# Patient Record
Sex: FEMALE | Race: WHITE | NOT HISPANIC OR LATINO | URBAN - METROPOLITAN AREA
[De-identification: names, ages, dates, MRNs, and addresses within clinical notes are randomized per-mention and may not be internally consistent; named-entity substitution may affect disease eponyms.]

---

## 2018-05-05 ENCOUNTER — EMERGENCY (EMERGENCY)
Facility: HOSPITAL | Age: 68
LOS: 0 days | Discharge: HOME | End: 2018-05-05
Attending: EMERGENCY MEDICINE | Admitting: EMERGENCY MEDICINE

## 2018-05-05 VITALS
WEIGHT: 190.04 LBS | OXYGEN SATURATION: 100 % | RESPIRATION RATE: 18 BRPM | TEMPERATURE: 98 F | SYSTOLIC BLOOD PRESSURE: 164 MMHG | HEART RATE: 70 BPM | DIASTOLIC BLOOD PRESSURE: 77 MMHG | HEIGHT: 67 IN

## 2018-05-05 VITALS
SYSTOLIC BLOOD PRESSURE: 144 MMHG | OXYGEN SATURATION: 100 % | RESPIRATION RATE: 18 BRPM | DIASTOLIC BLOOD PRESSURE: 82 MMHG | TEMPERATURE: 98 F | HEART RATE: 70 BPM

## 2018-05-05 DIAGNOSIS — Z79.82 LONG TERM (CURRENT) USE OF ASPIRIN: ICD-10-CM

## 2018-05-05 DIAGNOSIS — R10.9 UNSPECIFIED ABDOMINAL PAIN: ICD-10-CM

## 2018-05-05 DIAGNOSIS — E03.9 HYPOTHYROIDISM, UNSPECIFIED: ICD-10-CM

## 2018-05-05 DIAGNOSIS — I10 ESSENTIAL (PRIMARY) HYPERTENSION: ICD-10-CM

## 2018-05-05 DIAGNOSIS — E78.5 HYPERLIPIDEMIA, UNSPECIFIED: ICD-10-CM

## 2018-05-05 DIAGNOSIS — Z79.899 OTHER LONG TERM (CURRENT) DRUG THERAPY: ICD-10-CM

## 2018-05-05 DIAGNOSIS — R19.7 DIARRHEA, UNSPECIFIED: ICD-10-CM

## 2018-05-05 DIAGNOSIS — R10.12 LEFT UPPER QUADRANT PAIN: ICD-10-CM

## 2018-05-05 LAB
ALBUMIN SERPL ELPH-MCNC: 4.2 G/DL — SIGNIFICANT CHANGE UP (ref 3.5–5.2)
ALP SERPL-CCNC: 63 U/L — SIGNIFICANT CHANGE UP (ref 30–115)
ALT FLD-CCNC: 12 U/L — SIGNIFICANT CHANGE UP (ref 0–41)
ANION GAP SERPL CALC-SCNC: 12 MMOL/L — SIGNIFICANT CHANGE UP (ref 7–14)
APPEARANCE UR: CLEAR — SIGNIFICANT CHANGE UP
AST SERPL-CCNC: 15 U/L — SIGNIFICANT CHANGE UP (ref 0–41)
BACTERIA # UR AUTO: (no result)
BASOPHILS # BLD AUTO: 0.04 K/UL — SIGNIFICANT CHANGE UP (ref 0–0.2)
BASOPHILS NFR BLD AUTO: 0.6 % — SIGNIFICANT CHANGE UP (ref 0–1)
BILIRUB SERPL-MCNC: 0.4 MG/DL — SIGNIFICANT CHANGE UP (ref 0.2–1.2)
BILIRUB UR-MCNC: NEGATIVE — SIGNIFICANT CHANGE UP
BUN SERPL-MCNC: 17 MG/DL — SIGNIFICANT CHANGE UP (ref 10–20)
CALCIUM SERPL-MCNC: 9.5 MG/DL — SIGNIFICANT CHANGE UP (ref 8.5–10.1)
CHLORIDE SERPL-SCNC: 104 MMOL/L — SIGNIFICANT CHANGE UP (ref 98–110)
CK MB CFR SERPL CALC: 1.8 NG/ML — SIGNIFICANT CHANGE UP (ref 0.6–6.3)
CK SERPL-CCNC: 166 U/L — SIGNIFICANT CHANGE UP (ref 0–225)
CO2 SERPL-SCNC: 24 MMOL/L — SIGNIFICANT CHANGE UP (ref 17–32)
COLOR SPEC: YELLOW — SIGNIFICANT CHANGE UP
CREAT SERPL-MCNC: 0.9 MG/DL — SIGNIFICANT CHANGE UP (ref 0.7–1.5)
DIFF PNL FLD: (no result)
EOSINOPHIL # BLD AUTO: 0.29 K/UL — SIGNIFICANT CHANGE UP (ref 0–0.7)
EOSINOPHIL NFR BLD AUTO: 4.2 % — SIGNIFICANT CHANGE UP (ref 0–8)
EPI CELLS # UR: (no result) /HPF
GLUCOSE SERPL-MCNC: 109 MG/DL — HIGH (ref 70–99)
GLUCOSE UR QL: NEGATIVE MG/DL — SIGNIFICANT CHANGE UP
HCT VFR BLD CALC: 27.6 % — LOW (ref 37–47)
HGB BLD-MCNC: 8.1 G/DL — LOW (ref 12–16)
IMM GRANULOCYTES NFR BLD AUTO: 0.3 % — SIGNIFICANT CHANGE UP (ref 0.1–0.3)
KETONES UR-MCNC: NEGATIVE — SIGNIFICANT CHANGE UP
LACTATE SERPL-SCNC: 2.3 MMOL/L — HIGH (ref 0.5–2.2)
LEUKOCYTE ESTERASE UR-ACNC: (no result)
LIDOCAIN IGE QN: 28 U/L — SIGNIFICANT CHANGE UP (ref 7–60)
LYMPHOCYTES # BLD AUTO: 1.81 K/UL — SIGNIFICANT CHANGE UP (ref 1.2–3.4)
LYMPHOCYTES # BLD AUTO: 26.5 % — SIGNIFICANT CHANGE UP (ref 20.5–51.1)
MCHC RBC-ENTMCNC: 19.5 PG — LOW (ref 27–31)
MCHC RBC-ENTMCNC: 29.3 G/DL — LOW (ref 32–37)
MCV RBC AUTO: 66.5 FL — LOW (ref 81–99)
MONOCYTES # BLD AUTO: 0.77 K/UL — HIGH (ref 0.1–0.6)
MONOCYTES NFR BLD AUTO: 11.3 % — HIGH (ref 1.7–9.3)
NEUTROPHILS # BLD AUTO: 3.91 K/UL — SIGNIFICANT CHANGE UP (ref 1.4–6.5)
NEUTROPHILS NFR BLD AUTO: 57.1 % — SIGNIFICANT CHANGE UP (ref 42.2–75.2)
NITRITE UR-MCNC: NEGATIVE — SIGNIFICANT CHANGE UP
NRBC # BLD: 0 /100 WBCS — SIGNIFICANT CHANGE UP (ref 0–0)
PH UR: 6 — SIGNIFICANT CHANGE UP (ref 5–8)
PLATELET # BLD AUTO: 343 K/UL — SIGNIFICANT CHANGE UP (ref 130–400)
POTASSIUM SERPL-MCNC: 4.3 MMOL/L — SIGNIFICANT CHANGE UP (ref 3.5–5)
POTASSIUM SERPL-SCNC: 4.3 MMOL/L — SIGNIFICANT CHANGE UP (ref 3.5–5)
PROT SERPL-MCNC: 7.4 G/DL — SIGNIFICANT CHANGE UP (ref 6–8)
PROT UR-MCNC: NEGATIVE MG/DL — SIGNIFICANT CHANGE UP
RBC # BLD: 4.15 M/UL — LOW (ref 4.2–5.4)
RBC # FLD: 19.9 % — HIGH (ref 11.5–14.5)
RBC CASTS # UR COMP ASSIST: (no result) /HPF
SODIUM SERPL-SCNC: 140 MMOL/L — SIGNIFICANT CHANGE UP (ref 135–146)
SP GR SPEC: 1.02 — SIGNIFICANT CHANGE UP (ref 1.01–1.03)
TROPONIN T SERPL-MCNC: <0.01 NG/ML — SIGNIFICANT CHANGE UP
UROBILINOGEN FLD QL: 0.2 MG/DL — SIGNIFICANT CHANGE UP (ref 0.2–0.2)
WBC # BLD: 6.84 K/UL — SIGNIFICANT CHANGE UP (ref 4.8–10.8)
WBC # FLD AUTO: 6.84 K/UL — SIGNIFICANT CHANGE UP (ref 4.8–10.8)
WBC UR QL: SIGNIFICANT CHANGE UP /HPF

## 2018-05-05 RX ORDER — ACETAMINOPHEN 500 MG
975 TABLET ORAL ONCE
Qty: 0 | Refills: 0 | Status: COMPLETED | OUTPATIENT
Start: 2018-05-05 | End: 2018-05-05

## 2018-05-05 RX ADMIN — Medication 30 MILLILITER(S): at 03:54

## 2018-05-05 RX ADMIN — Medication 975 MILLIGRAM(S): at 06:49

## 2018-05-05 NOTE — ED PROVIDER NOTE - MEDICAL DECISION MAKING DETAILS
67f w L sided abd pain and mild tender w loose stools. Labs & CT reviewed. Pt reports that she has known anemia and forgot to mention it earlier. No symptoms of anemia at this time. pt comfortable, no further abd tender, tolerating PO intake. results d/w pt and advised regarding importance of outpatient f/u w PMD & GI. pt given copy of results. pt advised regarding symptomatic care and symptoms to prompt ED return

## 2018-05-05 NOTE — ED PROVIDER NOTE - ATTENDING CONTRIBUTION TO CARE
67f w a hx of HLD presents w L sided abdominal pain starting 3 days ago. Pain is cramping/sharp, moderate, intermittent, no radiating, worse after eating. Pt also reporting loose stools. Pt recently traveled to [place].    Review of Systems  Constitutional:  No fever or chills.   Eyes:  Negative.   ENMT:  No nasal congestion, discharge, or throat pain.   Cardiac:  No chest pain, syncope, or edema.  Respiratory:  No dyspnea, wheezing, or cough. No hemoptysis.  GI:  No vomiting, melena, or hematochezia.  :  No dysuria or hematuria. No vaginal bleeding/discharge.  Musculoskeletal:  No joint swelling, joint pain, or back pain.  Skin:  No skin rash, pruritis, jaundice, or lesions.  Neuro:  No headache, loss of sensation, or focal weakness.  No change in mental status.     Physical Exam  General: Awake, alert, NAD, WDWN, non-toxic appearing, NCAT  Eyes: PERRL, EOMI, no icterus, lids and conjunctivae are normal  ENT: External inspection normal, pink/moist membranes, pharynx normal  CV: S1S2, regular rate and rhythm, no murmur/gallops/rubs, no JVD, 2+ pulses b/l, no edema/cords/homans, warm/well-perfused  Respiratory: Normal respiratory rate/effort, no respiratory distress, normal voice, speaking full sentences, lungs clear to auscultation b/l, no wheezing/rales/rhonchi, no retractions, no stridor  Abdomen: Soft abdomen, mild LUQ tender, no distended/guarding/rebound, no CVA tender  Musculoskeletal: FROM all 4 extremities, N/V intact  Neck: FROM neck, supple, no meningismus, trachea midline, no JVD  Integumentary: Color normal for race, warm and dry, no rash  Neuro: Oriented x3, CN 2-12 grossly intact, normal motor, normal sensory  Psych: Oriented x3, mood normal, affect normal     67f w L sided abd pain and mild tender w loose stools. --CBC, CMP, lipase, UA, CT abd r/o diverticulitis. --Analgesai as needed, observe/re-assess.

## 2018-05-05 NOTE — ED PROVIDER NOTE - NS ED ROS FT
Eyes:  No visual changes  ENMT:  No hearing changes  Cardiac:  No chest pain, SOB   Respiratory:  No cough or respiratory distress.   :  No dysuria, frequency or burning.  MS:  No back pain.  Neuro:  No headache   Skin:  No skin rash.   Endocrine: No history of diabetes.

## 2018-05-05 NOTE — ED PROVIDER NOTE - OBJECTIVE STATEMENT
67 y F pmh including hypothyroidism, hld, c sections, cc left sided abdominal pain associated with sometimes loose stools x 3 days, intermittent crampy, no fever no vomiting, no dysuria or hematuria, no vag bleeding. 67 y F pmh including hypothyroidism, hld, cc left sided abdominal pain associated with sometimes loose stools x 3 days, intermittent crampy, no fever no vomiting, no dysuria or hematuria, no vag bleeding.

## 2018-05-05 NOTE — ED PROVIDER NOTE - PHYSICAL EXAMINATION
CONSTITUTIONAL: Well-developed; well-nourished; in no acute distress.   SKIN: warm, dry  HEAD: Normocephalic; atraumatic.  EYES: no conj injection  ENT: No nasal discharge; airway clear.  NECK: Supple; non tender.  CARD: S1, S2 normal; no murmurs, gallops, or rubs. Regular rate and rhythm.   RESP: No wheezes, rales or rhonchi.  ABD: soft, non distended, points to left flank as site of pain, no cva tenderness  EXT: Normal ROM.  No clubbing, cyanosis or edema.   LYMPH: No acute cervical adenopathy.  NEURO: Alert, oriented, grossly unremarkable  PSYCH: Cooperative, appropriate.

## 2018-06-04 ENCOUNTER — RESULT REVIEW (OUTPATIENT)
Age: 68
End: 2018-06-04

## 2018-06-04 ENCOUNTER — OUTPATIENT (OUTPATIENT)
Dept: OUTPATIENT SERVICES | Facility: HOSPITAL | Age: 68
LOS: 1 days | Discharge: HOME | End: 2018-06-04

## 2018-06-04 VITALS — SYSTOLIC BLOOD PRESSURE: 136 MMHG | RESPIRATION RATE: 18 BRPM | DIASTOLIC BLOOD PRESSURE: 65 MMHG | HEART RATE: 65 BPM

## 2018-06-04 VITALS
WEIGHT: 184.97 LBS | HEART RATE: 86 BPM | RESPIRATION RATE: 18 BRPM | DIASTOLIC BLOOD PRESSURE: 86 MMHG | HEIGHT: 67 IN | SYSTOLIC BLOOD PRESSURE: 181 MMHG | TEMPERATURE: 98 F

## 2018-06-04 RX ORDER — METOPROLOL TARTRATE 50 MG
0 TABLET ORAL
Qty: 0 | Refills: 0 | COMMUNITY

## 2018-06-04 NOTE — H&P ADULT - NSHPPHYSICALEXAM_GEN_ALL_CORE
PHYSICAL EXAM:   Vital Signs:  Vital Signs Last 24 Hrs  T(C): 36.7 (04 Jun 2018 06:55), Max: 36.7 (04 Jun 2018 06:55)  T(F): 98 (04 Jun 2018 06:55), Max: 98 (04 Jun 2018 06:55)  HR: 86 (04 Jun 2018 06:55) (86 - 86)  BP: 181/86 (04 Jun 2018 06:55) (181/86 - 181/86)  BP(mean): --  RR: 18 (04 Jun 2018 06:55) (18 - 18)  SpO2: --  Daily Height in cm: 170.18 (04 Jun 2018 06:55)    Daily     GENERAL:  Appears stated age, well-groomed, well-nourished, no distress  HEENT:  NC/AT,  conjunctivae clear and pink, no thyromegaly, nodules, adenopathy, no JVD, sclera -anicteric  CHEST:  Full & symmetric excursion, no increased effort, breath sounds clear  HEART:  Regular rhythm, S1, S2, no murmur/rub/S3/S4, no abdominal bruit, no edema  ABDOMEN:  Soft, non-tender, non-distended, normoactive bowel sounds,  no masses ,no hepato-splenomegaly, no signs of chronic liver disease  EXTEREMITIES:  no cyanosis,clubbing or edema  SKIN:  No rash/erythema/ecchymoses/petechiae/wounds/abscess/warm/dry  NEURO:  Alert, oriented, no asterixis, no tremor, no encephalopathy

## 2018-06-04 NOTE — ASU DISCHARGE PLAN (ADULT/PEDIATRIC). - NOTIFY
Fever greater than 101/Inability to Tolerate Liquids or Foods/Pain not relieved by Medications/Persistent Nausea and Vomiting/Bleeding that does not stop

## 2018-06-04 NOTE — PRE-ANESTHESIA EVALUATION ADULT - HEIGHT IN CM
Patient received to [address] [place] [address] room # 6  Ambulatory from [facility]. Patient scheduled for generator change today with Dr Poncho Clifton. Procedure reviewed & questions answered, voiced good understanding consent obtained & placed on chart. All medications and medical history reviewed. Will prep patient per orders. Patient & family updated on plan of care. 170.18

## 2018-06-06 DIAGNOSIS — D64.9 ANEMIA, UNSPECIFIED: ICD-10-CM

## 2018-06-06 DIAGNOSIS — D12.5 BENIGN NEOPLASM OF SIGMOID COLON: ICD-10-CM

## 2018-06-06 DIAGNOSIS — K64.8 OTHER HEMORRHOIDS: ICD-10-CM

## 2018-06-06 DIAGNOSIS — I10 ESSENTIAL (PRIMARY) HYPERTENSION: ICD-10-CM

## 2018-06-06 DIAGNOSIS — Z12.11 ENCOUNTER FOR SCREENING FOR MALIGNANT NEOPLASM OF COLON: ICD-10-CM

## 2018-06-06 DIAGNOSIS — K57.30 DIVERTICULOSIS OF LARGE INTESTINE WITHOUT PERFORATION OR ABSCESS WITHOUT BLEEDING: ICD-10-CM

## 2018-06-06 DIAGNOSIS — K64.4 RESIDUAL HEMORRHOIDAL SKIN TAGS: ICD-10-CM

## 2018-06-18 ENCOUNTER — OUTPATIENT (OUTPATIENT)
Dept: OUTPATIENT SERVICES | Facility: HOSPITAL | Age: 68
LOS: 1 days | Discharge: HOME | End: 2018-06-18

## 2018-06-18 ENCOUNTER — RESULT REVIEW (OUTPATIENT)
Age: 68
End: 2018-06-18

## 2018-06-18 VITALS
WEIGHT: 179.9 LBS | DIASTOLIC BLOOD PRESSURE: 79 MMHG | HEIGHT: 67 IN | TEMPERATURE: 98 F | SYSTOLIC BLOOD PRESSURE: 139 MMHG | HEART RATE: 64 BPM | RESPIRATION RATE: 18 BRPM

## 2018-06-18 VITALS
RESPIRATION RATE: 18 BRPM | HEART RATE: 58 BPM | SYSTOLIC BLOOD PRESSURE: 126 MMHG | DIASTOLIC BLOOD PRESSURE: 55 MMHG | OXYGEN SATURATION: 99 %

## 2018-06-18 DIAGNOSIS — Z98.890 OTHER SPECIFIED POSTPROCEDURAL STATES: Chronic | ICD-10-CM

## 2018-06-21 DIAGNOSIS — K31.9 DISEASE OF STOMACH AND DUODENUM, UNSPECIFIED: ICD-10-CM

## 2018-06-21 DIAGNOSIS — K29.50 UNSPECIFIED CHRONIC GASTRITIS WITHOUT BLEEDING: ICD-10-CM

## 2018-06-21 DIAGNOSIS — K29.80 DUODENITIS WITHOUT BLEEDING: ICD-10-CM

## 2018-06-21 DIAGNOSIS — D64.9 ANEMIA, UNSPECIFIED: ICD-10-CM

## 2018-06-21 DIAGNOSIS — K44.9 DIAPHRAGMATIC HERNIA WITHOUT OBSTRUCTION OR GANGRENE: ICD-10-CM

## 2019-05-28 PROBLEM — E03.9 HYPOTHYROIDISM, UNSPECIFIED: Chronic | Status: ACTIVE | Noted: 2018-05-05

## 2019-05-28 PROBLEM — R00.2 PALPITATIONS: Chronic | Status: ACTIVE | Noted: 2018-05-05

## 2019-05-28 PROBLEM — E78.00 PURE HYPERCHOLESTEROLEMIA, UNSPECIFIED: Chronic | Status: ACTIVE | Noted: 2018-06-18

## 2019-05-30 ENCOUNTER — OUTPATIENT (OUTPATIENT)
Dept: OUTPATIENT SERVICES | Facility: HOSPITAL | Age: 69
LOS: 1 days | Discharge: HOME | End: 2019-05-30
Payer: MEDICARE

## 2019-05-30 DIAGNOSIS — E03.9 HYPOTHYROIDISM, UNSPECIFIED: ICD-10-CM

## 2019-05-30 DIAGNOSIS — Z98.890 OTHER SPECIFIED POSTPROCEDURAL STATES: Chronic | ICD-10-CM

## 2019-05-30 PROCEDURE — 76536 US EXAM OF HEAD AND NECK: CPT | Mod: 26

## 2022-08-29 ENCOUNTER — APPOINTMENT (OUTPATIENT)
Dept: ORTHOPEDIC SURGERY | Facility: CLINIC | Age: 72
End: 2022-08-29

## 2022-08-29 VITALS — WEIGHT: 160 LBS | HEIGHT: 67 IN | BODY MASS INDEX: 25.11 KG/M2

## 2022-08-29 PROBLEM — Z00.00 ENCOUNTER FOR PREVENTIVE HEALTH EXAMINATION: Status: ACTIVE | Noted: 2022-08-29

## 2022-08-29 PROCEDURE — 99203 OFFICE O/P NEW LOW 30 MIN: CPT

## 2022-08-29 PROCEDURE — 73562 X-RAY EXAM OF KNEE 3: CPT | Mod: RT

## 2022-08-29 NOTE — DATA REVIEWED
[FreeTextEntry1] :   X-rays taken the office today for right knee show tricompartmental primary osteoarthritis without any acute displaced fractures or bony abnormalities.

## 2022-08-29 NOTE — PHYSICAL EXAM
[de-identified] :   Physical exam of her right knee:  Negative swelling.  There is a small superficial abrasion over the anterior aspect of the knee.  No signs of infection.  Nontender over the patellar facet.  Negative patellar grind.  She can actively straight leg raise.  Full range of  motion.  Mild medial and lateral joint tenderness.  Calves are soft and nontender.  Negative James.  Stable to anterior/posterior drawer, valgus/varus stress today.  Strength in her lower extremities 5/5.  Sensory and motor are intact.

## 2022-08-29 NOTE — HISTORY OF PRESENT ILLNESS
[de-identified] :   Patient is a 71-year-old female here for evaluation of her right knee.She states that yesterday, 08/29/2022, she was playing wiffle ball when she fell landed on her knee. She has been having some discomfort since then.  She does feel better since last night.  She took 2 aleve.  He denies any instability.

## 2022-08-29 NOTE — DISCUSSION/SUMMARY
[de-identified] :   She will ice and rest the area.\par She know she has a history of knee arthritis.  It typically does not give her much trouble.\par She will continue taking Aleve twice a day for inflammation.\par She may weightbear as tolerated.\par We discussed the option of a cortisone injection in the future.\par All questions were answered today.  She will contact the office with any questions or concerns.\par I will see her back in a month for repeat evaluation if she has continued pain.

## 2022-09-27 ENCOUNTER — APPOINTMENT (OUTPATIENT)
Dept: ORTHOPEDIC SURGERY | Facility: CLINIC | Age: 72
End: 2022-09-27

## 2022-09-27 VITALS — WEIGHT: 160 LBS | BODY MASS INDEX: 25.11 KG/M2 | HEIGHT: 67 IN

## 2022-09-27 DIAGNOSIS — S80.01XA CONTUSION OF RIGHT KNEE, INITIAL ENCOUNTER: ICD-10-CM

## 2022-09-27 PROCEDURE — 99213 OFFICE O/P EST LOW 20 MIN: CPT | Mod: 25

## 2022-09-27 PROCEDURE — 20610 DRAIN/INJ JOINT/BURSA W/O US: CPT

## 2022-09-27 NOTE — PHYSICAL EXAM
[de-identified] :   Physical exam of her right knee:  Negative swelling or ecchymosis.  Nontender over the patellar facet.  Negative patellar grind.  She can actively straight leg raise.  Mild medial and lateral joint tenderness.  Calves are soft and nontender.  Negative James.  Stable to anterior/posterior drawer, valgus/varus stress testing.  Strength in her lower extremities 5/5.  Sensory and motor are intact.

## 2022-09-27 NOTE — HISTORY OF PRESENT ILLNESS
[de-identified] :   Patient is a 71-year-old female here for repeat evaluation of her right knee.  She is doing much better.  She takes Advil as needed.  The pain is worse at night.  Her symptoms have improved since her previous visit.

## 2022-09-27 NOTE — DISCUSSION/SUMMARY
[de-identified] :   Overall, she is feeling much better.\par She is about a month status post her injury.\par She is feeling better but still has some discomfort at night.\par We discussed the risks and benefits of a cortisone injection.  She would like to proceed with the injection.\par Under sterile technique with the patient's consent, I injected 3 cc dexamethasone and 2 cc of 1% lidocaine into the lateral joint space of the right knee.  She tolerated the procedure well.  The puncture sites cover the Band-Aid.  She was advised to ice and rest the area.\par She understands she cannot get another cortisone injection for another 3-4 months.\par She will follow up in the office on as-needed basis.  She will contact if she has any questions or concerns.

## 2023-05-25 ENCOUNTER — APPOINTMENT (OUTPATIENT)
Dept: ORTHOPEDIC SURGERY | Facility: CLINIC | Age: 73
End: 2023-05-25
Payer: MEDICARE

## 2023-05-25 PROCEDURE — 99213 OFFICE O/P EST LOW 20 MIN: CPT | Mod: 25

## 2023-05-25 PROCEDURE — 20610 DRAIN/INJ JOINT/BURSA W/O US: CPT | Mod: RT

## 2023-05-25 PROCEDURE — 73560 X-RAY EXAM OF KNEE 1 OR 2: CPT | Mod: RT

## 2023-05-25 RX ORDER — MELOXICAM 15 MG/1
15 TABLET ORAL
Qty: 30 | Refills: 1 | Status: ACTIVE | COMMUNITY
Start: 2023-05-25 | End: 1900-01-01

## 2023-05-25 NOTE — IMAGING
[de-identified] : Right knee exam is as follows: Minimal effusion noted.  No erythema or ecchymosis.  Able to perform active straight leg raise.  Knee flexion from 0 to 120 degrees.  Patellofemoral, medial/lateral joint line tenderness to palpation.  Calf is soft and nontender.  Positive James's.  Light touch intact throughout.  Nonantalgic gait.

## 2023-05-25 NOTE — DISCUSSION/SUMMARY
[de-identified] : Mobic 15 mg PO QD PRN was sent to patient's pharmacy to help alleviate their symptoms.  The patient was advised to rest/ice the area and may alternate with warm compresses as needed.  The knee conditioning program from the [facility] was given to the patient so they may try that at home.\par \par With the patient's approval, and under sterile technique, I performed a steroid injection today.  See the attached procedure note for further details.  The patient was informed that their next cortisone injection could not be until a minimum of three months from today's date and the patient understands.  Explained to the patient that the full effect of the injection will take 3-5 days to kick in. \par \par Right knee MRI ordered for further evaluation.  Patient was advised to call the office a few days after getting the MRI done to discuss results over the phone.  The patient will follow-up in 3 months for further evaluation.  All of the patient's questions/concerns were answered in detail.\par \par

## 2023-05-25 NOTE — HISTORY OF PRESENT ILLNESS
[de-identified] : Patient is a 72-year-old female who reports to the office for reevaluation of her right knee pain.  She had a cortisone injection done in September of last year which gave her relief and is interested in having another one done today.  Walking, certain range of motion, palpating certain areas of the knee aggravate the patient's pain at times.  Admits to the knee clicks occasionally.  Denies any numbness or tingling

## 2023-09-14 ENCOUNTER — APPOINTMENT (OUTPATIENT)
Dept: ORTHOPEDIC SURGERY | Facility: CLINIC | Age: 73
End: 2023-09-14
Payer: MEDICARE

## 2023-09-14 PROCEDURE — 99213 OFFICE O/P EST LOW 20 MIN: CPT

## 2023-12-08 ENCOUNTER — APPOINTMENT (OUTPATIENT)
Dept: ORTHOPEDIC SURGERY | Facility: CLINIC | Age: 73
End: 2023-12-08

## 2023-12-15 ENCOUNTER — APPOINTMENT (OUTPATIENT)
Dept: ORTHOPEDIC SURGERY | Facility: CLINIC | Age: 73
End: 2023-12-15
Payer: MEDICARE

## 2023-12-15 PROCEDURE — 99213 OFFICE O/P EST LOW 20 MIN: CPT | Mod: 25

## 2023-12-15 PROCEDURE — 20610 DRAIN/INJ JOINT/BURSA W/O US: CPT | Mod: RT

## 2023-12-15 NOTE — DISCUSSION/SUMMARY
[de-identified] : Right knee Synvisc 1 gel injection  HPI Patient is a 73-year-old female who reports to office for subsequent reevaluation of her right knee pain/osteoarthritis.  She is here to receive her right knee Synvisc 1 gel injection.  Right knee exam is as follows: No effusion noted.  No erythema or ecchymosis.  Knee flexion from 0-110 degrees.  Light touch intact throughout.  Nonantalgic gait.  Assessment/plan With the patient's approval, the right knee Synvisc-1 injection was performed in the office today.  See the attached procedure note for further details.  Explained to the patient that the full effect of the medication may take up to 6 weeks for it to kick in.  The patient was advised to rest/ice the area and can alternate with warm compresses.  Instructed not to do any strenuous activity that would further aggravate their symptoms.  Patient will follow-up in 4-5 months for further evaluation.  All of the patient's questions/concerns were answered in detail.

## 2023-12-15 NOTE — PROCEDURE
[Large Joint Injection] : Large joint injection [Right] : of the right [Knee] : knee [Alcohol] : alcohol [Ethyl Chloride sprayed topically] : ethyl chloride sprayed topically [Sterile technique used] : sterile technique used [Synvisc-one (48mg)] : 48mg of Synvisc-one [] : Patient tolerated procedure well [Call if redness, pain or fever occur] : call if redness, pain or fever occur [Apply ice for 15min out of every hour for the next 12-24 hours as tolerated] : apply ice for 15 minutes out of every hour for the next 12-24 hours as tolerated [Risks, benefits, alternatives discussed / Verbal consent obtained] : the risks benefits, and alternatives have been discussed, and verbal consent was obtained

## 2024-04-08 NOTE — ED ADULT TRIAGE NOTE - DIRECT TO ROOM CARE INITIATED:
05-May-2018 03:24
patient c/o chest pain since last night. patient denies shortness of breath, nausea or vomiting. past medical history of HTN- states this is resolved though

## 2024-04-11 ENCOUNTER — APPOINTMENT (OUTPATIENT)
Dept: ORTHOPEDIC SURGERY | Facility: CLINIC | Age: 74
End: 2024-04-11
Payer: MEDICARE

## 2024-04-11 DIAGNOSIS — M17.11 UNILATERAL PRIMARY OSTEOARTHRITIS, RIGHT KNEE: ICD-10-CM

## 2024-04-11 PROCEDURE — 99213 OFFICE O/P EST LOW 20 MIN: CPT

## 2024-04-11 NOTE — DISCUSSION/SUMMARY
[de-identified] : Right knee pain follow-up  HPI Patient is a 73-year-old female reports to office for subsequent reevaluation of her right knee pain.  She had a Synvisc 1 gel injection done in December 2023 which has given her relief.  Certain range of motion and palpating certain areas in the knee aggravate the patient's pain at times.  Denies any numbness or tingling.  Right knee exam is as follows: No effusion noted.  No erythema or ecchymosis.  Able to perform active straight leg raise.  Knee flexion from 0 to 120 degrees.  Mild medial joint line tenderness to palpation.  Calf is soft and nontender.  Light touch intact throughout.  Nonantalgic gait.  Assessment/plan Patient is doing well, and her pain has been well-controlled.  Synvisc 1 gel injection has given patient relief.  The knee conditioning program from the [facility] was given to the patient so they may try that at home.  OTC Tylenol or Motrin as needed for pain.  Follow-up in 4 months.  All questions/concerns were answered in detail.

## 2024-05-22 ENCOUNTER — INPATIENT (INPATIENT)
Facility: HOSPITAL | Age: 74
LOS: 0 days | Discharge: HOME | DRG: 440 | End: 2024-05-23
Attending: HOSPITALIST | Admitting: HOSPITALIST
Payer: MEDICARE

## 2024-05-22 VITALS
SYSTOLIC BLOOD PRESSURE: 147 MMHG | RESPIRATION RATE: 18 BRPM | HEART RATE: 96 BPM | TEMPERATURE: 98 F | OXYGEN SATURATION: 99 % | HEIGHT: 67 IN | DIASTOLIC BLOOD PRESSURE: 88 MMHG | WEIGHT: 158.95 LBS

## 2024-05-22 DIAGNOSIS — K85.90 ACUTE PANCREATITIS WITHOUT NECROSIS OR INFECTION, UNSPECIFIED: ICD-10-CM

## 2024-05-22 DIAGNOSIS — Z98.890 OTHER SPECIFIED POSTPROCEDURAL STATES: Chronic | ICD-10-CM

## 2024-05-22 LAB
ALBUMIN SERPL ELPH-MCNC: 4 G/DL — SIGNIFICANT CHANGE UP (ref 3.5–5.2)
ALP SERPL-CCNC: 82 U/L — SIGNIFICANT CHANGE UP (ref 30–115)
ALT FLD-CCNC: 13 U/L — SIGNIFICANT CHANGE UP (ref 0–41)
ANION GAP SERPL CALC-SCNC: 15 MMOL/L — HIGH (ref 7–14)
APPEARANCE UR: CLEAR — SIGNIFICANT CHANGE UP
AST SERPL-CCNC: 40 U/L — SIGNIFICANT CHANGE UP (ref 0–41)
BACTERIA # UR AUTO: NEGATIVE /HPF — SIGNIFICANT CHANGE UP
BASOPHILS # BLD AUTO: 0.03 K/UL — SIGNIFICANT CHANGE UP (ref 0–0.2)
BASOPHILS NFR BLD AUTO: 0.3 % — SIGNIFICANT CHANGE UP (ref 0–1)
BILIRUB SERPL-MCNC: 0.6 MG/DL — SIGNIFICANT CHANGE UP (ref 0.2–1.2)
BILIRUB UR-MCNC: NEGATIVE — SIGNIFICANT CHANGE UP
BUN SERPL-MCNC: 11 MG/DL — SIGNIFICANT CHANGE UP (ref 10–20)
CALCIUM SERPL-MCNC: 9.6 MG/DL — SIGNIFICANT CHANGE UP (ref 8.4–10.5)
CAST: 0 /LPF — SIGNIFICANT CHANGE UP (ref 0–4)
CHLORIDE SERPL-SCNC: 100 MMOL/L — SIGNIFICANT CHANGE UP (ref 98–110)
CO2 SERPL-SCNC: 22 MMOL/L — SIGNIFICANT CHANGE UP (ref 17–32)
COLOR SPEC: YELLOW — SIGNIFICANT CHANGE UP
CREAT SERPL-MCNC: 0.7 MG/DL — SIGNIFICANT CHANGE UP (ref 0.7–1.5)
DIFF PNL FLD: ABNORMAL
EGFR: 92 ML/MIN/1.73M2 — SIGNIFICANT CHANGE UP
EOSINOPHIL # BLD AUTO: 0.08 K/UL — SIGNIFICANT CHANGE UP (ref 0–0.7)
EOSINOPHIL NFR BLD AUTO: 0.9 % — SIGNIFICANT CHANGE UP (ref 0–8)
GLUCOSE SERPL-MCNC: 104 MG/DL — HIGH (ref 70–99)
GLUCOSE UR QL: NEGATIVE MG/DL — SIGNIFICANT CHANGE UP
HCT VFR BLD CALC: 39.5 % — SIGNIFICANT CHANGE UP (ref 37–47)
HGB BLD-MCNC: 12.7 G/DL — SIGNIFICANT CHANGE UP (ref 12–16)
IMM GRANULOCYTES NFR BLD AUTO: 0.4 % — HIGH (ref 0.1–0.3)
KETONES UR-MCNC: NEGATIVE MG/DL — SIGNIFICANT CHANGE UP
LEUKOCYTE ESTERASE UR-ACNC: ABNORMAL
LIDOCAIN IGE QN: 25 U/L — SIGNIFICANT CHANGE UP (ref 7–60)
LYMPHOCYTES # BLD AUTO: 1.31 K/UL — SIGNIFICANT CHANGE UP (ref 1.2–3.4)
LYMPHOCYTES # BLD AUTO: 14.7 % — LOW (ref 20.5–51.1)
MCHC RBC-ENTMCNC: 26.9 PG — LOW (ref 27–31)
MCHC RBC-ENTMCNC: 32.2 G/DL — SIGNIFICANT CHANGE UP (ref 32–37)
MCV RBC AUTO: 83.7 FL — SIGNIFICANT CHANGE UP (ref 81–99)
MONOCYTES # BLD AUTO: 1.1 K/UL — HIGH (ref 0.1–0.6)
MONOCYTES NFR BLD AUTO: 12.3 % — HIGH (ref 1.7–9.3)
NEUTROPHILS # BLD AUTO: 6.37 K/UL — SIGNIFICANT CHANGE UP (ref 1.4–6.5)
NEUTROPHILS NFR BLD AUTO: 71.4 % — SIGNIFICANT CHANGE UP (ref 42.2–75.2)
NITRITE UR-MCNC: NEGATIVE — SIGNIFICANT CHANGE UP
NRBC # BLD: 0 /100 WBCS — SIGNIFICANT CHANGE UP (ref 0–0)
PH UR: 6.5 — SIGNIFICANT CHANGE UP (ref 5–8)
PLATELET # BLD AUTO: 280 K/UL — SIGNIFICANT CHANGE UP (ref 130–400)
PMV BLD: 11.8 FL — HIGH (ref 7.4–10.4)
POTASSIUM SERPL-MCNC: 5.1 MMOL/L — HIGH (ref 3.5–5)
POTASSIUM SERPL-SCNC: 5.1 MMOL/L — HIGH (ref 3.5–5)
PROT SERPL-MCNC: 7.8 G/DL — SIGNIFICANT CHANGE UP (ref 6–8)
PROT UR-MCNC: NEGATIVE MG/DL — SIGNIFICANT CHANGE UP
RBC # BLD: 4.72 M/UL — SIGNIFICANT CHANGE UP (ref 4.2–5.4)
RBC # FLD: 15.1 % — HIGH (ref 11.5–14.5)
RBC CASTS # UR COMP ASSIST: 3 /HPF — SIGNIFICANT CHANGE UP (ref 0–4)
SODIUM SERPL-SCNC: 137 MMOL/L — SIGNIFICANT CHANGE UP (ref 135–146)
SP GR SPEC: 1.01 — SIGNIFICANT CHANGE UP (ref 1–1.03)
SQUAMOUS # UR AUTO: 2 /HPF — SIGNIFICANT CHANGE UP (ref 0–5)
UROBILINOGEN FLD QL: 0.2 MG/DL — SIGNIFICANT CHANGE UP (ref 0.2–1)
WBC # BLD: 8.93 K/UL — SIGNIFICANT CHANGE UP (ref 4.8–10.8)
WBC # FLD AUTO: 8.93 K/UL — SIGNIFICANT CHANGE UP (ref 4.8–10.8)
WBC UR QL: 1 /HPF — SIGNIFICANT CHANGE UP (ref 0–5)

## 2024-05-22 PROCEDURE — 80053 COMPREHEN METABOLIC PANEL: CPT

## 2024-05-22 PROCEDURE — 85025 COMPLETE CBC W/AUTO DIFF WBC: CPT

## 2024-05-22 PROCEDURE — 82150 ASSAY OF AMYLASE: CPT

## 2024-05-22 PROCEDURE — 82728 ASSAY OF FERRITIN: CPT

## 2024-05-22 PROCEDURE — 74177 CT ABD & PELVIS W/CONTRAST: CPT | Mod: 26,MC

## 2024-05-22 PROCEDURE — 83735 ASSAY OF MAGNESIUM: CPT

## 2024-05-22 PROCEDURE — 83550 IRON BINDING TEST: CPT

## 2024-05-22 PROCEDURE — 84100 ASSAY OF PHOSPHORUS: CPT

## 2024-05-22 PROCEDURE — 82607 VITAMIN B-12: CPT

## 2024-05-22 PROCEDURE — 83540 ASSAY OF IRON: CPT

## 2024-05-22 PROCEDURE — 99285 EMERGENCY DEPT VISIT HI MDM: CPT | Mod: FS

## 2024-05-22 PROCEDURE — 76770 US EXAM ABDO BACK WALL COMP: CPT

## 2024-05-22 PROCEDURE — 93010 ELECTROCARDIOGRAM REPORT: CPT

## 2024-05-22 PROCEDURE — 82746 ASSAY OF FOLIC ACID SERUM: CPT

## 2024-05-22 PROCEDURE — 36415 COLL VENOUS BLD VENIPUNCTURE: CPT

## 2024-05-22 RX ORDER — FAMOTIDINE 10 MG/ML
20 INJECTION INTRAVENOUS ONCE
Refills: 0 | Status: COMPLETED | OUTPATIENT
Start: 2024-05-22 | End: 2024-05-22

## 2024-05-22 RX ORDER — MORPHINE SULFATE 50 MG/1
2 CAPSULE, EXTENDED RELEASE ORAL ONCE
Refills: 0 | Status: DISCONTINUED | OUTPATIENT
Start: 2024-05-22 | End: 2024-05-22

## 2024-05-22 RX ORDER — SODIUM CHLORIDE 9 MG/ML
2000 INJECTION INTRAMUSCULAR; INTRAVENOUS; SUBCUTANEOUS ONCE
Refills: 0 | Status: COMPLETED | OUTPATIENT
Start: 2024-05-22 | End: 2024-05-22

## 2024-05-22 RX ADMIN — Medication 30 MILLILITER(S): at 15:39

## 2024-05-22 RX ADMIN — SODIUM CHLORIDE 2000 MILLILITER(S): 9 INJECTION INTRAMUSCULAR; INTRAVENOUS; SUBCUTANEOUS at 15:41

## 2024-05-22 RX ADMIN — FAMOTIDINE 20 MILLIGRAM(S): 10 INJECTION INTRAVENOUS at 15:41

## 2024-05-22 RX ADMIN — MORPHINE SULFATE 2 MILLIGRAM(S): 50 CAPSULE, EXTENDED RELEASE ORAL at 20:58

## 2024-05-22 NOTE — ED PROVIDER NOTE - OBJECTIVE STATEMENT
73-year-old female with past medical history HLD, palpitations on metoprolol, hypothyroidism presents with complaints of abdominal pain x 2 weeks.  Reports abdominal pain is localized to bilateral upper quadrants, described as sharp and dull intermittently.  Reports exacerbated with lying flat and eating.  Denies fever/chills, chest pain, shortness of breath, vomiting/diarrhea, dysuria/frequency/urgency/hematuria, lightheadedness, dizziness, vaginal bleeding/discharge.

## 2024-05-22 NOTE — PATIENT PROFILE ADULT - FALL HARM RISK - UNIVERSAL INTERVENTIONS
Bed in lowest position, wheels locked, appropriate side rails in place/Call bell, personal items and telephone in reach/Instruct patient to call for assistance before getting out of bed or chair/Non-slip footwear when patient is out of bed/[place] to call system/Physically safe environment - no spills, clutter or unnecessary equipment/Purposeful Proactive Rounding/Room/bathroom lighting operational, light cord in reach

## 2024-05-22 NOTE — ED PROVIDER NOTE - PHYSICAL EXAMINATION
Vital Signs: I have reviewed the initial vital signs.  Constitutional: appears stated age, no acute distress  Eyes: Sclera clear, EOMI.  Cardiovascular: S1 and S2, regular rate, regular rhythm, well-perfused extremities, radial pulses equal and 2+, pedal pulses 2+ and equal  Respiratory: unlabored respiratory effort, clear to auscultation bilaterally no wheezing, rales, or rhonchi  Gastrointestinal:  abdomen soft, mild epigastric ttp  Musculoskeletal: supple neck, no lower extremity edema  Integumentary: warm, dry, no rash  Neurologic: awake, alert, oriented x3, extremities’ motor and sensory functions grossly intact

## 2024-05-22 NOTE — ED ADULT TRIAGE NOTE - WEIGHT IN LBS
158.9
PAST SURGICAL HISTORY:  History of appendectomy     History of cholecystectomy     History of hip surgery Right Hip Replacement

## 2024-05-22 NOTE — ED PROVIDER NOTE - CLINICAL SUMMARY MEDICAL DECISION MAKING FREE TEXT BOX
Patient with abdominal pain, found to have a pancreatic mass, likely due to acute pancreatitis.  Patient be admitted to medicine and have a GI evaluation.  Any ordered labs and EKG were reviewed.  Any imaging was ordered and reviewed by me.  Appropriate medications for patient's presenting complaints were ordered and effects were reassessed.  Patient's records (prior hospital, ED visit, and/or nursing home notes if available) were reviewed.  Additional history was obtained from EMS, family, and/or PCP (where available).  Escalation to admission/observation was considered.  Patient requires inpatient hospitalization - monitored setting.

## 2024-05-22 NOTE — ED PROVIDER NOTE - ATTENDING APP SHARED VISIT CONTRIBUTION OF CARE
73-year-old female with a past medical history of hyperlipidemia, palpitations, recently diagnosed pancreatic cyst, hypothyroidism presents with 2 to 3 weeks of upper abdominal discomfort and nausea.  No clear trigger.  Denies any chest pain or shortness of breath.  No fever, vomiting, diarrhea or blood in stool.  No GI bleed symptoms.  On exam nontoxic, vital signs noted, abdomen soft, nondistended, mild epigastric and bilateral upper quadrant tenderness, no rebound or guarding.  Normal work of breathing.  Given recent possible mass on outpatient workup will obtain CT abdomen and pelvis.  Labs overall reassuring. disposition pending w/up and reassessment.

## 2024-05-23 ENCOUNTER — TRANSCRIPTION ENCOUNTER (OUTPATIENT)
Age: 74
End: 2024-05-23

## 2024-05-23 VITALS
DIASTOLIC BLOOD PRESSURE: 80 MMHG | SYSTOLIC BLOOD PRESSURE: 155 MMHG | OXYGEN SATURATION: 96 % | RESPIRATION RATE: 18 BRPM | TEMPERATURE: 98 F | HEART RATE: 71 BPM

## 2024-05-23 LAB
ALBUMIN SERPL ELPH-MCNC: 3.6 G/DL — SIGNIFICANT CHANGE UP (ref 3.5–5.2)
ALP SERPL-CCNC: 71 U/L — SIGNIFICANT CHANGE UP (ref 30–115)
ALT FLD-CCNC: 9 U/L — SIGNIFICANT CHANGE UP (ref 0–41)
AMYLASE P1 CFR SERPL: 43 U/L — SIGNIFICANT CHANGE UP (ref 25–115)
ANION GAP SERPL CALC-SCNC: 11 MMOL/L — SIGNIFICANT CHANGE UP (ref 7–14)
AST SERPL-CCNC: 21 U/L — SIGNIFICANT CHANGE UP (ref 0–41)
BASOPHILS # BLD AUTO: 0.02 K/UL — SIGNIFICANT CHANGE UP (ref 0–0.2)
BASOPHILS NFR BLD AUTO: 0.3 % — SIGNIFICANT CHANGE UP (ref 0–1)
BILIRUB SERPL-MCNC: 0.8 MG/DL — SIGNIFICANT CHANGE UP (ref 0.2–1.2)
BUN SERPL-MCNC: 8 MG/DL — LOW (ref 10–20)
CALCIUM SERPL-MCNC: 8.9 MG/DL — SIGNIFICANT CHANGE UP (ref 8.4–10.4)
CHLORIDE SERPL-SCNC: 101 MMOL/L — SIGNIFICANT CHANGE UP (ref 98–110)
CO2 SERPL-SCNC: 23 MMOL/L — SIGNIFICANT CHANGE UP (ref 17–32)
CREAT SERPL-MCNC: 0.7 MG/DL — SIGNIFICANT CHANGE UP (ref 0.7–1.5)
EGFR: 92 ML/MIN/1.73M2 — SIGNIFICANT CHANGE UP
EOSINOPHIL # BLD AUTO: 0.08 K/UL — SIGNIFICANT CHANGE UP (ref 0–0.7)
EOSINOPHIL NFR BLD AUTO: 1.3 % — SIGNIFICANT CHANGE UP (ref 0–8)
GLUCOSE SERPL-MCNC: 107 MG/DL — HIGH (ref 70–99)
HCT VFR BLD CALC: 34.4 % — LOW (ref 37–47)
HGB BLD-MCNC: 11.4 G/DL — LOW (ref 12–16)
IMM GRANULOCYTES NFR BLD AUTO: 0.3 % — SIGNIFICANT CHANGE UP (ref 0.1–0.3)
IRON SATN MFR SERPL: 11 % — LOW (ref 15–50)
IRON SATN MFR SERPL: 27 UG/DL — LOW (ref 35–150)
LYMPHOCYTES # BLD AUTO: 0.92 K/UL — LOW (ref 1.2–3.4)
LYMPHOCYTES # BLD AUTO: 14.6 % — LOW (ref 20.5–51.1)
MAGNESIUM SERPL-MCNC: 2 MG/DL — SIGNIFICANT CHANGE UP (ref 1.8–2.4)
MCHC RBC-ENTMCNC: 27.6 PG — SIGNIFICANT CHANGE UP (ref 27–31)
MCHC RBC-ENTMCNC: 33.1 G/DL — SIGNIFICANT CHANGE UP (ref 32–37)
MCV RBC AUTO: 83.3 FL — SIGNIFICANT CHANGE UP (ref 81–99)
MONOCYTES # BLD AUTO: 0.83 K/UL — HIGH (ref 0.1–0.6)
MONOCYTES NFR BLD AUTO: 13.2 % — HIGH (ref 1.7–9.3)
NEUTROPHILS # BLD AUTO: 4.42 K/UL — SIGNIFICANT CHANGE UP (ref 1.4–6.5)
NEUTROPHILS NFR BLD AUTO: 70.3 % — SIGNIFICANT CHANGE UP (ref 42.2–75.2)
NRBC # BLD: 0 /100 WBCS — SIGNIFICANT CHANGE UP (ref 0–0)
PHOSPHATE SERPL-MCNC: 2.9 MG/DL — SIGNIFICANT CHANGE UP (ref 2.1–4.9)
PLATELET # BLD AUTO: 264 K/UL — SIGNIFICANT CHANGE UP (ref 130–400)
PMV BLD: 10.8 FL — HIGH (ref 7.4–10.4)
POTASSIUM SERPL-MCNC: 4.1 MMOL/L — SIGNIFICANT CHANGE UP (ref 3.5–5)
POTASSIUM SERPL-SCNC: 4.1 MMOL/L — SIGNIFICANT CHANGE UP (ref 3.5–5)
PROT SERPL-MCNC: 6.9 G/DL — SIGNIFICANT CHANGE UP (ref 6–8)
RBC # BLD: 4.13 M/UL — LOW (ref 4.2–5.4)
RBC # FLD: 15 % — HIGH (ref 11.5–14.5)
SODIUM SERPL-SCNC: 135 MMOL/L — SIGNIFICANT CHANGE UP (ref 135–146)
TIBC SERPL-MCNC: 242 UG/DL — SIGNIFICANT CHANGE UP (ref 220–430)
UIBC SERPL-MCNC: 215 UG/DL — SIGNIFICANT CHANGE UP (ref 110–370)
WBC # BLD: 6.29 K/UL — SIGNIFICANT CHANGE UP (ref 4.8–10.8)
WBC # FLD AUTO: 6.29 K/UL — SIGNIFICANT CHANGE UP (ref 4.8–10.8)

## 2024-05-23 PROCEDURE — 99223 1ST HOSP IP/OBS HIGH 75: CPT | Mod: AI

## 2024-05-23 PROCEDURE — 76770 US EXAM ABDO BACK WALL COMP: CPT | Mod: 26

## 2024-05-23 RX ORDER — METOPROLOL TARTRATE 50 MG
25 TABLET ORAL DAILY
Refills: 0 | Status: DISCONTINUED | OUTPATIENT
Start: 2024-05-23 | End: 2024-05-23

## 2024-05-23 RX ORDER — ENOXAPARIN SODIUM 100 MG/ML
40 INJECTION SUBCUTANEOUS EVERY 24 HOURS
Refills: 0 | Status: DISCONTINUED | OUTPATIENT
Start: 2024-05-23 | End: 2024-05-23

## 2024-05-23 RX ORDER — POLYETHYLENE GLYCOL 3350 17 G/17G
17 POWDER, FOR SOLUTION ORAL DAILY
Refills: 0 | Status: DISCONTINUED | OUTPATIENT
Start: 2024-05-24 | End: 2024-05-23

## 2024-05-23 RX ORDER — IRON SUCROSE 20 MG/ML
200 INJECTION, SOLUTION INTRAVENOUS ONCE
Refills: 0 | Status: COMPLETED | OUTPATIENT
Start: 2024-05-23 | End: 2024-05-23

## 2024-05-23 RX ORDER — SENNA PLUS 8.6 MG/1
2 TABLET ORAL AT BEDTIME
Refills: 0 | Status: DISCONTINUED | OUTPATIENT
Start: 2024-05-23 | End: 2024-05-23

## 2024-05-23 RX ORDER — FAMOTIDINE 10 MG/ML
40 INJECTION INTRAVENOUS DAILY
Refills: 0 | Status: DISCONTINUED | OUTPATIENT
Start: 2024-05-23 | End: 2024-05-23

## 2024-05-23 RX ORDER — ROSUVASTATIN CALCIUM 5 MG/1
1 TABLET ORAL
Refills: 0 | DISCHARGE

## 2024-05-23 RX ORDER — POLYETHYLENE GLYCOL 3350 17 G/17G
17 POWDER, FOR SOLUTION ORAL ONCE
Refills: 0 | Status: COMPLETED | OUTPATIENT
Start: 2024-05-23 | End: 2024-05-23

## 2024-05-23 RX ORDER — METOPROLOL TARTRATE 50 MG
1 TABLET ORAL
Qty: 0 | Refills: 0 | DISCHARGE

## 2024-05-23 RX ORDER — ASPIRIN/CALCIUM CARB/MAGNESIUM 324 MG
81 TABLET ORAL DAILY
Refills: 0 | Status: DISCONTINUED | OUTPATIENT
Start: 2024-05-23 | End: 2024-05-23

## 2024-05-23 RX ORDER — LEVOTHYROXINE SODIUM 125 MCG
75 TABLET ORAL DAILY
Refills: 0 | Status: DISCONTINUED | OUTPATIENT
Start: 2024-05-23 | End: 2024-05-23

## 2024-05-23 RX ORDER — ASPIRIN/CALCIUM CARB/MAGNESIUM 324 MG
1 TABLET ORAL
Qty: 0 | Refills: 0 | DISCHARGE

## 2024-05-23 RX ORDER — SODIUM CHLORIDE 9 MG/ML
1000 INJECTION, SOLUTION INTRAVENOUS
Refills: 0 | Status: DISCONTINUED | OUTPATIENT
Start: 2024-05-23 | End: 2024-05-23

## 2024-05-23 RX ORDER — BUDESONIDE AND FORMOTEROL FUMARATE DIHYDRATE 160; 4.5 UG/1; UG/1
2 AEROSOL RESPIRATORY (INHALATION)
Refills: 0 | Status: DISCONTINUED | OUTPATIENT
Start: 2024-05-23 | End: 2024-05-23

## 2024-05-23 RX ORDER — IBUPROFEN 200 MG
600 TABLET ORAL EVERY 6 HOURS
Refills: 0 | Status: DISCONTINUED | OUTPATIENT
Start: 2024-05-23 | End: 2024-05-23

## 2024-05-23 RX ORDER — ROSUVASTATIN CALCIUM 5 MG/1
0 TABLET ORAL
Qty: 0 | Refills: 0 | DISCHARGE

## 2024-05-23 RX ORDER — ATORVASTATIN CALCIUM 80 MG/1
80 TABLET, FILM COATED ORAL AT BEDTIME
Refills: 0 | Status: DISCONTINUED | OUTPATIENT
Start: 2024-05-23 | End: 2024-05-23

## 2024-05-23 RX ORDER — LEVOTHYROXINE SODIUM 125 MCG
0 TABLET ORAL
Qty: 0 | Refills: 0 | DISCHARGE

## 2024-05-23 RX ADMIN — Medication 75 MICROGRAM(S): at 05:45

## 2024-05-23 RX ADMIN — Medication 600 MILLIGRAM(S): at 05:45

## 2024-05-23 RX ADMIN — Medication 25 MILLIGRAM(S): at 05:45

## 2024-05-23 RX ADMIN — POLYETHYLENE GLYCOL 3350 17 GRAM(S): 17 POWDER, FOR SOLUTION ORAL at 13:25

## 2024-05-23 NOTE — DISCHARGE NOTE PROVIDER - HOSPITAL COURSE
74yo F h/o HLD, Hypothyroid, Palpitations (on metoprolol) presents to evaluation of intermittent abdominal pain occurring over last 2 weeks. Patient reports pain is localized to b/l upper quadrants, describing it as both sharp and dull, exacerbated with lying flat and eating.     On arrival, T 98.4, /88, HR 96, RR 18, SpO2 99%; Labs unremarkable including lipase; CT A/P w findings concerning for acute pancreatitis despite normal lipase level.  UA showed gross hemturia  Patient admitted for further evaluation.     CT on admission showing peripancreatic fat stranding central area of hypodensity within the pancreatic body (measuring 2.2 x 1.8 cm), along w new numerous hepatic hypodense lesions highly concerning for metastatic disease  Patient recently was evaluated by GI outpatient and had MRCP 4/10/2024 along w CA 19-9, which was wnl (31)  - MRCP report at the time "tiny barely perceptible punctate body and tail sidebranch cysts measuring up to 3mm corresponding to the sonographic abnormality without high risk worrysome features most compatible with the earliest manifestation of IPMN. Mild layering gallbladder sludge is notes sonographically without gallstones or biliary obstruction. there is a tiny 3mm gallbladder polyp stratified as low risk."\    Patient received IV fluids and pain medications. She was able to tolerate PO intake the next morning and her pain subsided. She  was discharged on 05/23/24

## 2024-05-23 NOTE — DISCHARGE NOTE PROVIDER - NSDCFUSCHEDAPPT_GEN_ALL_CORE_FT
[facility]  [facility] [address]  Scheduled Appointment: 08/08/2024

## 2024-05-23 NOTE — DISCHARGE NOTE PROVIDER - CARE PROVIDER_API CALL
Virginia Navarrete  Internal Medicine  [address] [place] [id number]  Phone: [phone]  Fax: [phone]  Follow Up Time: 2 weeks    Jimy Lee.  Urology  [address] [place] [id number]  Phone: [phone]  Fax: [phone]  Follow Up Time: 2 weeks

## 2024-05-23 NOTE — H&P ADULT - NSHPPHYSICALEXAM_GEN_ALL_CORE
Vital Signs Last 24 Hrs  T(C): 36.8 (22 May 2024 20:43), Max: 37.2 (22 May 2024 15:57)  T(F): 98.2 (22 May 2024 20:43), Max: 99 (22 May 2024 15:57)  HR: 82 (22 May 2024 20:43) (78 - 96)  BP: 149/89 (22 May 2024 20:43) (147/88 - 152/86)  BP(mean): --  RR: 18 (22 May 2024 15:57) (18 - 18)  SpO2: 98% (22 May 2024 15:57) (98% - 99%)    Parameters below as of 22 May 2024 15:57  Patient On (Oxygen Delivery Method): room air    PHYSICAL EXAM  GENERAL: NAD  HEENT:  NCAT, EOMI, MMM  NECK: Supple, Nontender  NERVOUS SYSTEM: AAOx3, NFD   CHEST/LUNG: + BS b/l  HEART: +s1s2 RRR  ABDOMEN: soft, NT/ND  EXTREMITIES: pp, no edema  SKIN: No rashes or lesions

## 2024-05-23 NOTE — DISCHARGE NOTE PROVIDER - NSDCMRMEDTOKEN_GEN_ALL_CORE_FT
budesonide-formoterol 160 mcg-4.5 mcg/inh inhalation aerosol: 1 puff(s) inhaled 2 times a day as needed for  bronchospasm  dicyclomine 20 mg oral tablet: 1 tab(s) orally 3 times a day as needed for  mild pain  ibandronate 150 mg oral tablet: 1 tab(s) orally  metoprolol succinate 25 mg oral tablet, extended release: 1 tab(s) orally once a day  Pepcid 40 mg oral tablet: 1 tab(s) orally once a day  Repatha 140 mg/mL subcutaneous solution: 140 milligram(s) subcutaneously every 2 weeks  Synthroid 75 mcg (0.075 mg) oral tablet: 1 tab(s) orally once a day

## 2024-05-23 NOTE — H&P ADULT - HISTORY OF PRESENT ILLNESS
74yo F h/o HTN, HLD, Hypothyroid presents to evaluation of intermittent abdominal pain occurring over last 2 weeks. Patient reports pain is localized to b/l upper quadrants, describing it as both sharp and dull, exacerbated with lying flat and eating.    On arrival, T 98.4, /88, HR 96, RR 18, SpO2 99%; Labs unremarkable including lipase; CT A/P w findings concerning for acute pancreatitis despite normal lipase level.    Patient admitted for further evaluation.     74yo F h/o HLD, Hypothyroid, Palpitations (on metoprolol) presents to evaluation of intermittent abdominal pain occurring over last 2 weeks. Patient reports pain is localized to b/l upper quadrants, describing it as both sharp and dull, exacerbated with lying flat and eating.     On arrival, T 98.4, /88, HR 96, RR 18, SpO2 99%; Labs unremarkable including lipase; CT A/P w findings concerning for acute pancreatitis despite normal lipase level.    Patient admitted for further evaluation.

## 2024-05-23 NOTE — H&P ADULT - ATTENDING COMMENTS
74yo F h/o HLD, Hypothyroid, Palpitations (on metoprolol) presents to evaluation of intermittent abdominal pain occurring over last 2 weeks. Patient reports pain is localized to b/l upper quadrants, describing it as both sharp and dull, exacerbated with lying flat and eating. Pt has chronic constipation, likely abdominal pain due to intestinal cramps. CT findings known to pt's private GI doctor Adam who completed extensive work up as an OP ( ERCP, MRCP), pt has no clinical sings of pancreatitis, she has scheduled follow up appointment with GI on 5/29.  Today pt c/o constipation, denies abdominal pain, tolerated liquid diet, asking about discharge.     Vital Signs Last 24 Hrs  T(C): 36.7 (23 May 2024 05:26), Max: 37.2 (22 May 2024 15:57)  T(F): 98 (23 May 2024 05:26), Max: 99 (22 May 2024 15:57)  HR: 75 (23 May 2024 05:26) (75 - 96)  BP: 125/74 (23 May 2024 05:26) (125/74 - 152/86)  BP(mean): 91 (23 May 2024 05:26) (91 - 91)  RR: 18 (23 May 2024 05:26) (18 - 18)  SpO2: 98% (23 May 2024 05:26) (98% - 99%)    PHYSICAL EXAM  GENERAL: NAD, pleasant   NECK: Supple, no JVD  NERVOUS SYSTEM: AAOx3, normal neuro exam   CHEST/LUNG: CTA b/L   HEART: +s1s2 RRR  ABDOMEN: soft, nontender, distended , BS present   EXTREMITIES: pp, no edema  SKIN: No rashes or lesions    LABS:                         11.4   6.29  )-----------( 264      ( 23 May 2024 07:04 )             34.4   05-23    135  |  101  |  8<L>  ----------------------------<  107<H>  4.1   |  23  |  0.7    Ca    8.9      23 May 2024 07:04  Phos  2.9     05-23  Mg     2.0     05-23    TPro  6.9  /  Alb  3.6  /  TBili  0.8  /  DBili  x   /  AST  21  /  ALT  9   /  AlkPhos  71  05-23    A/P   74yo F h/o HTN, HLD, Hypothyroid presents to evaluation of intermittent abdominal pain w imaging findings concerning for acute pancreatitis.    # Nonspecific abdominal pain/ chronic constipation   -likely due to constipation, cramps   - start Bentyl 10 mg TID with meals   - give Senna, Miralax, Lactulose   - CT and MRCP reviewed, extensive work up was done by pt's private GI DR Medina as an OP, she has a follow up appointment with him on 5/29   - pt has no clinical sings of pancreatitis, will advance diet to regular and plan discharge this afternoon with OP follow up     # Iron deficiency anemia  - pt follows up with Dr Lemus hematology   - give one dose of IV Venofer 200 mg today prior discharge   - c/w po Iron and bowel regimen     # Microhematuria   - will get kidney/bladder US to r/o nephrolithiasis ( pending)     #HLD   - c/w  repatha injections recently d/t transaminitis    #H/o palpitations   - on toprol 25mg    #Hypothyroid   -on synthroid 75mcg mwf abd 88mcg ttss    #Osteoporosis  -  on ibandronate    DVT ppx: lovenox  GI ppx: ppi    #Progress Note Handoff  Pending (specify):  advance diet, start Bentyl 10 mg TID with meals, give Laxatives , get kidney/bladder US, plan discharge for this afternoon   Family discussion: I spoke with pt, she agreed with a plan of care   Disposition: Home

## 2024-05-23 NOTE — DISCHARGE NOTE PROVIDER - NSDCCPCAREPLAN_GEN_ALL_CORE_FT
PRINCIPAL DISCHARGE DIAGNOSIS  Diagnosis: Acute pancreatitis  Assessment and Plan of Treatment: You were admitted for further management of possible acute pancreatitis. You were given IV fluids and pain meds to controle your pain. Please follow up with your outpatiet Gastroenterologist as an outpatient and your primary care physician      SECONDARY DISCHARGE DIAGNOSES  Diagnosis: Painless hematuria  Assessment and Plan of Treatment: Please follow up with an outpatient urologist for further work up of blood in your urine. You had an ultrasound of your kidneys done inpatient

## 2024-05-23 NOTE — H&P ADULT - ASSESSMENT
72yo F h/o HTN, HLD, Hypothyroid presents to evaluation of intermittent abdominal pain w imaging findings concerning for acute pancreatitis.    #Abd pain 2/2 pancreatitis +/- malignancy  - CT showing peripancreatic fat stranding central area of hypodensity within the pancreatic body (measuring 2.2 x 1.8 cm), along w new numerous hepatic hypodense lesions highly concerning for metastatic disease  - consider GI eval, along w MRI for further evaluation  - In meantime, cont ivf, along w analgesics & antiemetics prn; advance diet as tolerated    #HLD on rosuvastatin and repatha    #HTN/palpitations on metoprolol    #Hypothyroid on synthroid    #Osteoporosis on ibandronate    #GERD on pepcid    DVT ppx: lovenox  GI ppx: ppi  Activity: IAT  Diet: AAT 72yo F h/o HTN, HLD, Hypothyroid presents to evaluation of intermittent abdominal pain w imaging findings concerning for acute pancreatitis.    #Abd pain 2/2 pancreatitis  - patient endorses abdominal pain over last 2 week since recently traveling; abdomen nontender on exam  - CT on admission showing peripancreatic fat stranding central area of hypodensity within the pancreatic body (measuring 2.2 x 1.8 cm), along w new numerous hepatic hypodense lesions highly concerning for metastatic disease  - of note, patient recently was evaluated by GI outpatient and had MR abdomen MR Cholangiogram w& w/o iv contrast, along w CA 19-9, which was wnl (31)  - MRI report "tiny barely perceptible punctate body and tail sidebranch cysts measuring up to 3mm corresponding to the sonographic abnormality without high risk worrysome features most compatible with the earliest manifestation of IPMN. Mild layering gallbladder sludge is notes sonographically without gallstones or biliary obstruction. there is a tiny 3mm gallbladder polyp stratified as low risk."  - doubt need for further inpatient workup  - patient can likely be d/c home if tolerating diet    #HLD  - pcp switched crestor to repatha injections recently  d/t transaminities    #H/o palpitations for which patient is on metoprolol    #Hypothyroid on synthroid    #Osteoporosis noted on recent dexa - on ibandronate    DVT ppx: lovenox  GI ppx: ppi  Activity: IAT  Diet: AAT 74yo F h/o HTN, HLD, Hypothyroid presents to evaluation of intermittent abdominal pain w imaging findings concerning for acute pancreatitis.    #Abd pain 2/2 pancreatitis  - patient endorses abdominal pain over last 2 week since recently traveling; abdomen nontender on exam  - CT on admission showing peripancreatic fat stranding central area of hypodensity within the pancreatic body (measuring 2.2 x 1.8 cm), along w new numerous hepatic hypodense lesions highly concerning for metastatic disease  - of note, patient recently was evaluated by GI outpatient and had MRCP 4/10/2024 along w CA 19-9, which was wnl (31)  - MRCP report "tiny barely perceptible punctate body and tail sidebranch cysts measuring up to 3mm corresponding to the sonographic abnormality without high risk worrysome features most compatible with the earliest manifestation of IPMN. Mild layering gallbladder sludge is notes sonographically without gallstones or biliary obstruction. there is a tiny 3mm gallbladder polyp stratified as low risk."  - doubt need for further inpatient workup > patient can likely be d/c home if tolerating diet    #HLD - pcp switched crestor to repatha injections recently d/t transaminitis    #H/o palpitations for which patient is on toprol 25mg    #Hypothyroid on synthroid 75mcg mwf abd 88mcg ttss    #Osteoporosis noted on recent dexa - on ibandronate    DVT ppx: lovenox  GI ppx: ppi  Activity: IAT  Diet: AAT

## 2024-05-23 NOTE — DISCHARGE NOTE PROVIDER - PROVIDER TOKENS
PROVIDER:[TOKEN:[[id number]],FOLLOWUP:[2 weeks]],PROVIDER:[TOKEN:[[id number]],FOLLOWUP:[2 weeks]]

## 2024-05-23 NOTE — DISCHARGE NOTE NURSING/CASE MANAGEMENT/SOCIAL WORK - NSDCPEFALRISK_GEN_ALL_CORE
For information on Fall & Injury Prevention, visit: [email or web address] OR  [email or web address] OR  https://www.cdc.gov/steadi/patient.html

## 2024-05-23 NOTE — DISCHARGE NOTE PROVIDER - CARE PROVIDERS DIRECT ADDRESSES
,DirectAddress_Unknown,[email or web address]

## 2024-05-23 NOTE — H&P ADULT - NSHPLABSRESULTS_GEN_ALL_CORE
Labs:                        12.7   8.93  )-----------( 280      ( 22 May 2024 16:02 )             39.5     137  |  100  |  11  ----------------------------<  104<H>  5.1<H>   |  22  |  0.7    Ca    9.6      22 May 2024 16:02    TPro  7.8  /  Alb  4.0  /  TBili  0.6  /  DBili  x   /  AST  40  /  ALT  13  /  AlkPhos  82  05-22    Urinalysis Basic - ( 22 May 2024 16:02 )  Color: Yellow / Appearance: Clear / S.007 / pH: x  Gluc: 104 mg/dL / Ketone: Negative mg/dL  / Bili: Negative / Urobili: 0.2 mg/dL   Blood: x / Protein: Negative mg/dL / Nitrite: Negative   Leuk Esterase: Trace / RBC: 3 /HPF / WBC 1 /HPF   Sq Epi: x / Non Sq Epi: 2 /HPF / Bacteria: Negative /HPF

## 2024-05-23 NOTE — DISCHARGE NOTE NURSING/CASE MANAGEMENT/SOCIAL WORK - PATIENT PORTAL LINK FT
You can access the FollowMyHealth Patient Portal offered by [facility] by registering at the following website: [email or web address]. By joining [employer]’s FollowMyHealth portal, you will also be able to view your health information using other applications (apps) compatible with our system.

## 2024-05-24 LAB
FERRITIN SERPL-MCNC: 109 NG/ML — SIGNIFICANT CHANGE UP (ref 13–330)
FOLATE SERPL-MCNC: >20 NG/ML — SIGNIFICANT CHANGE UP
VIT B12 SERPL-MCNC: 1229 PG/ML — SIGNIFICANT CHANGE UP (ref 232–1245)

## 2024-06-04 ENCOUNTER — APPOINTMENT (OUTPATIENT)
Dept: SURGERY | Facility: CLINIC | Age: 74
End: 2024-06-04
Payer: MEDICARE

## 2024-06-04 ENCOUNTER — OUTPATIENT (OUTPATIENT)
Dept: OUTPATIENT SERVICES | Facility: HOSPITAL | Age: 74
LOS: 1 days | End: 2024-06-04
Payer: MEDICARE

## 2024-06-04 VITALS
DIASTOLIC BLOOD PRESSURE: 100 MMHG | WEIGHT: 161 LBS | OXYGEN SATURATION: 98 % | HEIGHT: 67 IN | TEMPERATURE: 97.9 F | HEART RATE: 101 BPM | SYSTOLIC BLOOD PRESSURE: 142 MMHG | BODY MASS INDEX: 25.27 KG/M2

## 2024-06-04 DIAGNOSIS — Z00.8 ENCOUNTER FOR OTHER GENERAL EXAMINATION: ICD-10-CM

## 2024-06-04 DIAGNOSIS — Z79.890 HORMONE REPLACEMENT THERAPY: ICD-10-CM

## 2024-06-04 DIAGNOSIS — E78.5 HYPERLIPIDEMIA, UNSPECIFIED: ICD-10-CM

## 2024-06-04 DIAGNOSIS — K82.4 CHOLESTEROLOSIS OF GALLBLADDER: ICD-10-CM

## 2024-06-04 DIAGNOSIS — Z79.82 LONG TERM (CURRENT) USE OF ASPIRIN: ICD-10-CM

## 2024-06-04 DIAGNOSIS — M81.0 AGE-RELATED OSTEOPOROSIS WITHOUT CURRENT PATHOLOGICAL FRACTURE: ICD-10-CM

## 2024-06-04 DIAGNOSIS — D50.9 IRON DEFICIENCY ANEMIA, UNSPECIFIED: ICD-10-CM

## 2024-06-04 DIAGNOSIS — Z98.890 OTHER SPECIFIED POSTPROCEDURAL STATES: Chronic | ICD-10-CM

## 2024-06-04 DIAGNOSIS — E03.9 HYPOTHYROIDISM, UNSPECIFIED: ICD-10-CM

## 2024-06-04 DIAGNOSIS — R31.9 HEMATURIA, UNSPECIFIED: ICD-10-CM

## 2024-06-04 DIAGNOSIS — Z79.51 LONG TERM (CURRENT) USE OF INHALED STEROIDS: ICD-10-CM

## 2024-06-04 DIAGNOSIS — D13.6 BENIGN NEOPLASM OF PANCREAS: ICD-10-CM

## 2024-06-04 DIAGNOSIS — K59.09 OTHER CONSTIPATION: ICD-10-CM

## 2024-06-04 DIAGNOSIS — R10.9 UNSPECIFIED ABDOMINAL PAIN: ICD-10-CM

## 2024-06-04 PROCEDURE — 74183 MRI ABD W/O CNTR FLWD CNTR: CPT | Mod: 26,MH

## 2024-06-04 PROCEDURE — 99205 OFFICE O/P NEW HI 60 MIN: CPT

## 2024-06-04 PROCEDURE — 74183 MRI ABD W/O CNTR FLWD CNTR: CPT

## 2024-06-04 RX ORDER — PANTOPRAZOLE 40 MG/1
40 TABLET, DELAYED RELEASE ORAL DAILY
Qty: 30 | Refills: 2 | Status: ACTIVE | COMMUNITY
Start: 2024-06-04 | End: 1900-01-01

## 2024-06-05 DIAGNOSIS — R10.9 UNSPECIFIED ABDOMINAL PAIN: ICD-10-CM

## 2024-06-06 ENCOUNTER — TRANSCRIPTION ENCOUNTER (OUTPATIENT)
Age: 74
End: 2024-06-06

## 2024-06-06 ENCOUNTER — OUTPATIENT (OUTPATIENT)
Dept: OUTPATIENT SERVICES | Facility: HOSPITAL | Age: 74
LOS: 1 days | Discharge: HOME | End: 2024-06-06
Payer: MEDICARE

## 2024-06-06 ENCOUNTER — RESULT REVIEW (OUTPATIENT)
Age: 74
End: 2024-06-06

## 2024-06-06 VITALS
HEART RATE: 102 BPM | RESPIRATION RATE: 18 BRPM | TEMPERATURE: 101 F | DIASTOLIC BLOOD PRESSURE: 78 MMHG | OXYGEN SATURATION: 99 % | WEIGHT: 160.94 LBS | HEIGHT: 67 IN | SYSTOLIC BLOOD PRESSURE: 154 MMHG

## 2024-06-06 VITALS
RESPIRATION RATE: 18 BRPM | DIASTOLIC BLOOD PRESSURE: 80 MMHG | OXYGEN SATURATION: 96 % | SYSTOLIC BLOOD PRESSURE: 123 MMHG | HEART RATE: 76 BPM

## 2024-06-06 DIAGNOSIS — K86.89 OTHER SPECIFIED DISEASES OF PANCREAS: ICD-10-CM

## 2024-06-06 DIAGNOSIS — Z98.890 OTHER SPECIFIED POSTPROCEDURAL STATES: Chronic | ICD-10-CM

## 2024-06-06 PROCEDURE — 88173 CYTOPATH EVAL FNA REPORT: CPT

## 2024-06-06 PROCEDURE — 88305 TISSUE EXAM BY PATHOLOGIST: CPT | Mod: 26,XP

## 2024-06-06 PROCEDURE — 88305 TISSUE EXAM BY PATHOLOGIST: CPT

## 2024-06-06 PROCEDURE — 43238 EGD US FINE NEEDLE BX/ASPIR: CPT

## 2024-06-06 PROCEDURE — 88342 IMHCHEM/IMCYTCHM 1ST ANTB: CPT

## 2024-06-06 PROCEDURE — 43239 EGD BIOPSY SINGLE/MULTIPLE: CPT | Mod: XU

## 2024-06-06 PROCEDURE — 88305 TISSUE EXAM BY PATHOLOGIST: CPT | Mod: 26

## 2024-06-06 PROCEDURE — 88312 SPECIAL STAINS GROUP 1: CPT

## 2024-06-06 PROCEDURE — 88173 CYTOPATH EVAL FNA REPORT: CPT | Mod: 26

## 2024-06-06 PROCEDURE — 88342 IMHCHEM/IMCYTCHM 1ST ANTB: CPT | Mod: 26

## 2024-06-06 PROCEDURE — 88312 SPECIAL STAINS GROUP 1: CPT | Mod: 26

## 2024-06-06 RX ORDER — FAMOTIDINE 10 MG/ML
1 INJECTION INTRAVENOUS
Refills: 0 | DISCHARGE

## 2024-06-06 RX ORDER — ACETAMINOPHEN 500 MG
1000 TABLET ORAL ONCE
Refills: 0 | Status: COMPLETED | OUTPATIENT
Start: 2024-06-06 | End: 2024-06-06

## 2024-06-06 RX ORDER — HYDROMORPHONE HYDROCHLORIDE 2 MG/ML
0.5 INJECTION INTRAMUSCULAR; INTRAVENOUS; SUBCUTANEOUS ONCE
Refills: 0 | Status: DISCONTINUED | OUTPATIENT
Start: 2024-06-06 | End: 2024-06-06

## 2024-06-06 RX ORDER — BUDESONIDE AND FORMOTEROL FUMARATE DIHYDRATE 160; 4.5 UG/1; UG/1
1 AEROSOL RESPIRATORY (INHALATION)
Refills: 0 | DISCHARGE

## 2024-06-06 RX ORDER — ONDANSETRON 8 MG/1
4 TABLET, FILM COATED ORAL ONCE
Refills: 0 | Status: COMPLETED | OUTPATIENT
Start: 2024-06-06 | End: 2024-06-06

## 2024-06-06 RX ORDER — ONDANSETRON 4 MG/1
4 TABLET, ORALLY DISINTEGRATING ORAL EVERY 8 HOURS
Qty: 90 | Refills: 0 | Status: ACTIVE | COMMUNITY
Start: 2024-06-06 | End: 1900-01-01

## 2024-06-06 RX ADMIN — Medication 1000 MILLIGRAM(S): at 17:23

## 2024-06-06 RX ADMIN — HYDROMORPHONE HYDROCHLORIDE 0.5 MILLIGRAM(S): 2 INJECTION INTRAMUSCULAR; INTRAVENOUS; SUBCUTANEOUS at 17:05

## 2024-06-06 RX ADMIN — HYDROMORPHONE HYDROCHLORIDE 0.5 MILLIGRAM(S): 2 INJECTION INTRAMUSCULAR; INTRAVENOUS; SUBCUTANEOUS at 17:23

## 2024-06-06 RX ADMIN — Medication 1000 MILLIGRAM(S): at 16:35

## 2024-06-06 RX ADMIN — ONDANSETRON 4 MILLIGRAM(S): 8 TABLET, FILM COATED ORAL at 17:05

## 2024-06-06 NOTE — ASU DISCHARGE PLAN (ADULT/PEDIATRIC) - NS MD DC FALL RISK RISK
For information on Fall & Injury Prevention, visit: [email or web address] OR  [email or web address] OR  https://www.cdc.gov/steadi/patient.html

## 2024-06-06 NOTE — CHART NOTE - NSCHARTNOTEFT_GEN_A_CORE
PACU ANESTHESIA ADMISSION NOTE      Procedure:   Post op diagnosis:      ____  Intubated  TV:______       Rate: ______      FiO2: ______    __x__  Patent Airway    _x___  Full return of protective reflexes    ___x_  Full recovery from anesthesia / back to baseline status    Vitals:  T(f): 98.4 (06-06-24 @ 13:45), Max: 38.2 (06-06-24 @ 13:45)  HR: 75 (06-06-24 @ 13:45) (102 - 102)  BP: 109/57 (06-06-24 @ 13:45) (154/78 - 154/78)  RR: 14 (06-06-24 @ 13:45) (18 - 18)  SpO2: 95% (06-06-24 @ 13:45) (99% - 99%)    Mental Status:  __x__ Awake   ____x_ Alert   _____ Drowsy   _____ Sedated    Nausea/Vomiting:  __x__ NO  ______Yes,   See Post - Op Orders          Pain Scale (0-10):  ___5__    Treatment: ____ None    ___x_ See Post - Op/PCA Orders    Post - Operative Fluids:   ____ Oral   __x__ See Post - Op Orders    Plan: Discharge:   ____Home       __x___Floor     _____Critical Care    _____  Other:_________________    Comments: Transferred care to PACU RN; discharge to floor when criteria met.
yes

## 2024-06-07 ENCOUNTER — OUTPATIENT (OUTPATIENT)
Dept: OUTPATIENT SERVICES | Facility: HOSPITAL | Age: 74
LOS: 1 days | End: 2024-06-07
Payer: MEDICARE

## 2024-06-07 ENCOUNTER — RESULT REVIEW (OUTPATIENT)
Age: 74
End: 2024-06-07

## 2024-06-07 DIAGNOSIS — K86.89 OTHER SPECIFIED DISEASES OF PANCREAS: ICD-10-CM

## 2024-06-07 DIAGNOSIS — Z98.890 OTHER SPECIFIED POSTPROCEDURAL STATES: Chronic | ICD-10-CM

## 2024-06-07 PROCEDURE — 71260 CT THORAX DX C+: CPT

## 2024-06-07 PROCEDURE — 71260 CT THORAX DX C+: CPT | Mod: 26,MH

## 2024-06-08 DIAGNOSIS — K86.89 OTHER SPECIFIED DISEASES OF PANCREAS: ICD-10-CM

## 2024-06-09 NOTE — ASSESSMENT
[FreeTextEntry1] : KANDI LEMONS  is a pleasant 73 year old woman  who came in 06/04/2024 for pancreatic mass. She has Dr ADRIAN GANT as Her PCP.  statin stopped recently for elevated liver enzymes  weight loss about 7 pounds EGD/COLO  SCHEDULED FOR 6/7/24  Patient reports pain started in April , epigastric in nature. Relieved by Tums minimally. Mild relief with use of Pepto bismol. Went to urgent care Mid May with severe pain. Was treated with Pepcid and referred to GI Dr. Medina.  MRI showed several bilateral liver met, 4.4 x 2.8 cm hypoenhancing pancreatic body mass with soft tissue encasement at the celiac trifurcation.  impression met panc tumor, sending for CA 19-9, EUS , ordered pain meds including opoids the above plan of care with discussed in details to the patient and all questions were answered to patient satisfaction. patient instructed to follow up with the referring physician and patient primary care provider   A total of 80 minutes was spent on this visit, obtaining h/p,  reviewing previous notes/imaging, counseling the patient on coming procedure and f/u , ordering tests (below), and documenting the findings in the note. Alert-The patient is alert, awake and responds to voice. The patient is oriented to time, place, and person. The triage nurse is able to obtain subjective information.

## 2024-06-09 NOTE — HISTORY OF PRESENT ILLNESS
[de-identified] : KANDI LEMONS  is a pleasant 73 year old woman  who came in 06/04/2024 for pancreatic mass. She has Dr ADRIAN GANT as Her PCP.  statin stopped recently for elevated liver enzymes  weight loss about 7 pounds EGD/COLO  SCHEDULED FOR 6/7/24  Patient reports pain started in April , epigastric in nature. Relieved by Tums minimally. Mild relief with use of Pepto bismol. Went to urgent care Mid May with severe pain. Was treated with Pepcid and referred to GI Dr. Medina.

## 2024-06-10 ENCOUNTER — EMERGENCY (EMERGENCY)
Facility: HOSPITAL | Age: 74
LOS: 0 days | Discharge: HOME | End: 2024-06-10
Attending: EMERGENCY MEDICINE
Payer: MEDICARE

## 2024-06-10 VITALS
RESPIRATION RATE: 18 BRPM | TEMPERATURE: 100 F | OXYGEN SATURATION: 98 % | DIASTOLIC BLOOD PRESSURE: 73 MMHG | SYSTOLIC BLOOD PRESSURE: 115 MMHG | HEART RATE: 85 BPM

## 2024-06-10 VITALS
WEIGHT: 158.07 LBS | HEIGHT: 67 IN | SYSTOLIC BLOOD PRESSURE: 135 MMHG | DIASTOLIC BLOOD PRESSURE: 84 MMHG | OXYGEN SATURATION: 99 % | HEART RATE: 94 BPM | TEMPERATURE: 99 F | RESPIRATION RATE: 18 BRPM

## 2024-06-10 DIAGNOSIS — R10.13 EPIGASTRIC PAIN: ICD-10-CM

## 2024-06-10 DIAGNOSIS — Z20.822 CONTACT WITH AND (SUSPECTED) EXPOSURE TO COVID-19: ICD-10-CM

## 2024-06-10 DIAGNOSIS — Z85.07 PERSONAL HISTORY OF MALIGNANT NEOPLASM OF PANCREAS: ICD-10-CM

## 2024-06-10 DIAGNOSIS — K86.89 OTHER SPECIFIED DISEASES OF PANCREAS: ICD-10-CM

## 2024-06-10 DIAGNOSIS — Z98.890 OTHER SPECIFIED POSTPROCEDURAL STATES: Chronic | ICD-10-CM

## 2024-06-10 DIAGNOSIS — R11.0 NAUSEA: ICD-10-CM

## 2024-06-10 LAB
ALBUMIN SERPL ELPH-MCNC: 3 G/DL — LOW (ref 3.5–5.2)
ALP SERPL-CCNC: 100 U/L — SIGNIFICANT CHANGE UP (ref 30–115)
ALT FLD-CCNC: 9 U/L — SIGNIFICANT CHANGE UP (ref 0–41)
ANION GAP SERPL CALC-SCNC: 18 MMOL/L — HIGH (ref 7–14)
APPEARANCE UR: CLEAR — SIGNIFICANT CHANGE UP
AST SERPL-CCNC: 23 U/L — SIGNIFICANT CHANGE UP (ref 0–41)
BACTERIA # UR AUTO: ABNORMAL /HPF
BASOPHILS # BLD AUTO: 0.04 K/UL — SIGNIFICANT CHANGE UP (ref 0–0.2)
BASOPHILS NFR BLD AUTO: 0.3 % — SIGNIFICANT CHANGE UP (ref 0–1)
BILIRUB DIRECT SERPL-MCNC: 0.3 MG/DL — SIGNIFICANT CHANGE UP (ref 0–0.3)
BILIRUB INDIRECT FLD-MCNC: 0.4 MG/DL — SIGNIFICANT CHANGE UP (ref 0.2–1.2)
BILIRUB SERPL-MCNC: 0.7 MG/DL — SIGNIFICANT CHANGE UP (ref 0.2–1.2)
BILIRUB UR-MCNC: NEGATIVE — SIGNIFICANT CHANGE UP
BUN SERPL-MCNC: 7 MG/DL — LOW (ref 10–20)
CALCIUM SERPL-MCNC: 8.7 MG/DL — SIGNIFICANT CHANGE UP (ref 8.4–10.5)
CAST: 0 /LPF — SIGNIFICANT CHANGE UP (ref 0–4)
CHLORIDE SERPL-SCNC: 92 MMOL/L — LOW (ref 98–110)
CO2 SERPL-SCNC: 25 MMOL/L — SIGNIFICANT CHANGE UP (ref 17–32)
COLOR SPEC: YELLOW — SIGNIFICANT CHANGE UP
CREAT SERPL-MCNC: 0.6 MG/DL — LOW (ref 0.7–1.5)
DIFF PNL FLD: ABNORMAL
EGFR: 96 ML/MIN/1.73M2 — SIGNIFICANT CHANGE UP
EOSINOPHIL # BLD AUTO: 0.01 K/UL — SIGNIFICANT CHANGE UP (ref 0–0.7)
EOSINOPHIL NFR BLD AUTO: 0.1 % — SIGNIFICANT CHANGE UP (ref 0–8)
FLUAV AG NPH QL: SIGNIFICANT CHANGE UP
FLUBV AG NPH QL: SIGNIFICANT CHANGE UP
GLUCOSE SERPL-MCNC: 102 MG/DL — HIGH (ref 70–99)
GLUCOSE UR QL: NEGATIVE MG/DL — SIGNIFICANT CHANGE UP
HCT VFR BLD CALC: 31.3 % — LOW (ref 37–47)
HGB BLD-MCNC: 10.4 G/DL — LOW (ref 12–16)
IMM GRANULOCYTES NFR BLD AUTO: 1 % — HIGH (ref 0.1–0.3)
KETONES UR-MCNC: 15 MG/DL
LACTATE SERPL-SCNC: 1.1 MMOL/L — SIGNIFICANT CHANGE UP (ref 0.7–2)
LEUKOCYTE ESTERASE UR-ACNC: ABNORMAL
LIDOCAIN IGE QN: 15 U/L — SIGNIFICANT CHANGE UP (ref 7–60)
LYMPHOCYTES # BLD AUTO: 0.57 K/UL — LOW (ref 1.2–3.4)
LYMPHOCYTES # BLD AUTO: 3.9 % — LOW (ref 20.5–51.1)
MCHC RBC-ENTMCNC: 27.3 PG — SIGNIFICANT CHANGE UP (ref 27–31)
MCHC RBC-ENTMCNC: 33.2 G/DL — SIGNIFICANT CHANGE UP (ref 32–37)
MCV RBC AUTO: 82.2 FL — SIGNIFICANT CHANGE UP (ref 81–99)
MONOCYTES # BLD AUTO: 1.56 K/UL — HIGH (ref 0.1–0.6)
MONOCYTES NFR BLD AUTO: 10.8 % — HIGH (ref 1.7–9.3)
NEUTROPHILS # BLD AUTO: 12.12 K/UL — HIGH (ref 1.4–6.5)
NEUTROPHILS NFR BLD AUTO: 83.9 % — HIGH (ref 42.2–75.2)
NITRITE UR-MCNC: NEGATIVE — SIGNIFICANT CHANGE UP
NRBC # BLD: 0 /100 WBCS — SIGNIFICANT CHANGE UP (ref 0–0)
PH UR: 6.5 — SIGNIFICANT CHANGE UP (ref 5–8)
PLATELET # BLD AUTO: 342 K/UL — SIGNIFICANT CHANGE UP (ref 130–400)
PMV BLD: 10.7 FL — HIGH (ref 7.4–10.4)
POTASSIUM SERPL-MCNC: 4.4 MMOL/L — SIGNIFICANT CHANGE UP (ref 3.5–5)
POTASSIUM SERPL-SCNC: 4.4 MMOL/L — SIGNIFICANT CHANGE UP (ref 3.5–5)
PROT SERPL-MCNC: 6.7 G/DL — SIGNIFICANT CHANGE UP (ref 6–8)
PROT UR-MCNC: NEGATIVE MG/DL — SIGNIFICANT CHANGE UP
RBC # BLD: 3.81 M/UL — LOW (ref 4.2–5.4)
RBC # FLD: 14.7 % — HIGH (ref 11.5–14.5)
RBC CASTS # UR COMP ASSIST: 43 /HPF — HIGH (ref 0–4)
RSV RNA NPH QL NAA+NON-PROBE: SIGNIFICANT CHANGE UP
SARS-COV-2 RNA SPEC QL NAA+PROBE: SIGNIFICANT CHANGE UP
SODIUM SERPL-SCNC: 135 MMOL/L — SIGNIFICANT CHANGE UP (ref 135–146)
SP GR SPEC: 1.01 — SIGNIFICANT CHANGE UP (ref 1–1.03)
SQUAMOUS # UR AUTO: 9 /HPF — HIGH (ref 0–5)
SURGICAL PATHOLOGY STUDY: SIGNIFICANT CHANGE UP
UROBILINOGEN FLD QL: 1 MG/DL — SIGNIFICANT CHANGE UP (ref 0.2–1)
WBC # BLD: 14.44 K/UL — HIGH (ref 4.8–10.8)
WBC # FLD AUTO: 14.44 K/UL — HIGH (ref 4.8–10.8)
WBC UR QL: 3 /HPF — SIGNIFICANT CHANGE UP (ref 0–5)

## 2024-06-10 PROCEDURE — 71045 X-RAY EXAM CHEST 1 VIEW: CPT | Mod: 26

## 2024-06-10 PROCEDURE — 96375 TX/PRO/DX INJ NEW DRUG ADDON: CPT

## 2024-06-10 PROCEDURE — 81001 URINALYSIS AUTO W/SCOPE: CPT

## 2024-06-10 PROCEDURE — 99284 EMERGENCY DEPT VISIT MOD MDM: CPT

## 2024-06-10 PROCEDURE — 96376 TX/PRO/DX INJ SAME DRUG ADON: CPT

## 2024-06-10 PROCEDURE — 71045 X-RAY EXAM CHEST 1 VIEW: CPT

## 2024-06-10 PROCEDURE — 99285 EMERGENCY DEPT VISIT HI MDM: CPT | Mod: FS

## 2024-06-10 PROCEDURE — 87040 BLOOD CULTURE FOR BACTERIA: CPT | Mod: 59

## 2024-06-10 PROCEDURE — 85025 COMPLETE CBC W/AUTO DIFF WBC: CPT

## 2024-06-10 PROCEDURE — 83690 ASSAY OF LIPASE: CPT

## 2024-06-10 PROCEDURE — 74177 CT ABD & PELVIS W/CONTRAST: CPT | Mod: 26,MC

## 2024-06-10 PROCEDURE — 80076 HEPATIC FUNCTION PANEL: CPT

## 2024-06-10 PROCEDURE — 36415 COLL VENOUS BLD VENIPUNCTURE: CPT

## 2024-06-10 PROCEDURE — 0241U: CPT

## 2024-06-10 PROCEDURE — 99284 EMERGENCY DEPT VISIT MOD MDM: CPT | Mod: 25

## 2024-06-10 PROCEDURE — 83605 ASSAY OF LACTIC ACID: CPT

## 2024-06-10 PROCEDURE — 80048 BASIC METABOLIC PNL TOTAL CA: CPT

## 2024-06-10 PROCEDURE — 96374 THER/PROPH/DIAG INJ IV PUSH: CPT | Mod: XU

## 2024-06-10 PROCEDURE — 74177 CT ABD & PELVIS W/CONTRAST: CPT | Mod: MC

## 2024-06-10 RX ORDER — SODIUM CHLORIDE 9 MG/ML
1000 INJECTION, SOLUTION INTRAVENOUS ONCE
Refills: 0 | Status: COMPLETED | OUTPATIENT
Start: 2024-06-10 | End: 2024-06-10

## 2024-06-10 RX ORDER — KETOROLAC TROMETHAMINE 30 MG/ML
1 SYRINGE (ML) INJECTION
Qty: 12 | Refills: 0
Start: 2024-06-10 | End: 2024-06-13

## 2024-06-10 RX ORDER — ONDANSETRON 8 MG/1
4 TABLET, FILM COATED ORAL ONCE
Refills: 0 | Status: COMPLETED | OUTPATIENT
Start: 2024-06-10 | End: 2024-06-10

## 2024-06-10 RX ORDER — KETOROLAC TROMETHAMINE 30 MG/ML
15 SYRINGE (ML) INJECTION ONCE
Refills: 0 | Status: DISCONTINUED | OUTPATIENT
Start: 2024-06-10 | End: 2024-06-10

## 2024-06-10 RX ADMIN — Medication 15 MILLIGRAM(S): at 14:58

## 2024-06-10 RX ADMIN — SODIUM CHLORIDE 1000 MILLILITER(S): 9 INJECTION, SOLUTION INTRAVENOUS at 07:42

## 2024-06-10 RX ADMIN — Medication 15 MILLIGRAM(S): at 07:40

## 2024-06-10 RX ADMIN — ONDANSETRON 4 MILLIGRAM(S): 8 TABLET, FILM COATED ORAL at 07:41

## 2024-06-10 NOTE — ED PROVIDER NOTE - NSFOLLOWUPINSTRUCTIONS_ED_ALL_ED_FT
ABDOMINAL PAIN - General Information    Abdominal Pain    WHAT YOU NEED TO KNOW:    What do I need to know about abdominal pain? Abdominal pain may be felt anywhere between the bottom of your rib cage and your groin. Acute pain usually lasts less than 3 months. Chronic pain lasts longer than 3 months. Your pain may be sharp or dull. The pain may stay in the same place or move around. You may have the pain all the time, or it may come and go. Depending on the cause, you may also have nausea, vomiting, fever, or diarrhea.  Abdominal Organs    What causes abdominal pain? The cause may not be found. The following are common causes:    Overeating, gas pains, or food poisoning    Constipation or diarrhea    An injury    Appendicitis, a hernia, or an ulcer    Infection or a blockage    A liver, gallbladder, or kidney condition  How is the cause of abdominal pain diagnosed? Your healthcare provider will check your abdomen. He or she will ask where your pain is and when it started. Tell him or her if the pain wakes you or stops you from doing your daily activities. Describe anything that makes the pain better or worse. You may also need any of the following:    Blood, urine, or bowel movement samples may be tested for signs of an infection, disease, or injury.    X-ray pictures of your abdomen may show an injury or other cause of the pain.  How is abdominal pain treated?    Prescription pain medicine may be given. Ask your healthcare provider how to take this medicine safely. Some prescription pain medicines contain acetaminophen. Do not take other medicines that contain acetaminophen without talking to your healthcare provider. Too much acetaminophen may cause liver damage. Prescription pain medicine may cause constipation. Ask your healthcare provider how to prevent or treat constipation.    Medicines may be given to calm your stomach or prevent vomiting.    Relaxation therapy may be used along with pain medicine.    Surgery may be needed, depending on the cause.  What can I do to manage or prevent abdominal pain?    Apply heat on your abdomen for 20 to 30 minutes every 2 hours for as many days as directed. Heat helps decrease pain and muscle spasms.    Make changes to the foods you eat, if needed. Do not eat foods that cause abdominal pain or other symptoms. Eat small meals more often. The following changes may also help:  Eat more high-fiber foods if you are constipated. High-fiber foods include fruits, vegetables, whole-grain foods, and legumes such as pascual beans.        Do not eat foods that cause gas if you have bloating. Examples include broccoli, cabbage, beans, and carbonated drinks.    Do not eat foods or drinks that contain sorbitol or fructose if you have diarrhea and bloating. Some examples are fruit juices, candy, jelly, and sugar-free gum.    Do not eat high-fat foods. Examples include fried foods, cheeseburgers, hot dogs, and desserts.    Make changes to the liquids you drink, if needed. Do not drink liquids that cause pain or make it worse, such as orange juice. Drink liquids throughout the day to stay hydrated. The following changes may also help:  Drink more liquids to prevent dehydration from diarrhea or vomiting. Ask your healthcare provider how much liquid to drink each day and which liquids are best for you.    Limit or do not have caffeine. Caffeine may make symptoms such as heartburn or nausea worse.    Limit or do not drink alcohol. Alcohol can make your abdominal pain worse. Ask your healthcare provider if it is okay for you to drink alcohol. Also ask how much is okay for you to drink. A drink of alcohol is 12 ounces of beer, ½ ounce of liquor, or 5 ounces of wine.    Keep a diary of your abdominal pain. A diary may help your healthcare provider learn what is causing your pain. Include when the pain happens, how long it lasts, and what the pain feels like. Write down any other symptoms you have with abdominal pain. Also write down what you eat, and any symptoms you have after you eat.    Manage stress. Stress may cause abdominal pain. Your healthcare provider may recommend relaxation techniques and deep breathing exercises to help decrease your stress. Your healthcare provider may recommend you talk to someone about your stress or anxiety, such as a counselor or a friend. Get plenty of sleep. Exercise regularly.  [employer] Walking for Exercise      Do not smoke. Nicotine and other chemicals in cigarettes can damage your esophagus and stomach. Ask your healthcare provider for information if you currently smoke and need help to quit. E-cigarettes or smokeless tobacco still contain nicotine. Talk to your healthcare provider before you use these products.  Call your local emergency number (911 in the [place]) if:    You have chest pain or shortness of breath.    When should I seek immediate care?    You have pulsing pain in your upper abdomen or lower back that suddenly becomes constant.    Your pain is in the right lower abdominal area and worsens with movement.    You have a fever over 100.4°F (38°C) or shaking chills.    You are vomiting and cannot keep food or liquids down.    Your pain does not improve or gets worse over the next 8 to 12 hours.    You see blood in your vomit or bowel movements, or they look black and tarry.    Your skin or the whites of your eyes turn yellow.    You are a woman and have a large amount of vaginal bleeding that is not your monthly period.  When should I call my doctor?    You have pain in your lower back.    You are a man and have pain in your testicles.    You have pain when you urinate.    You have questions or concerns about your condition or care.  CARE AGREEMENT:    You have the right to help plan your care. Learn about your health condition and how it may be treated. Discuss treatment options with your healthcare providers to decide what care you want to receive. You always have the right to refuse treatment.

## 2024-06-10 NOTE — ED ADULT NURSE NOTE - OBJECTIVE STATEMENT
Pt c/o abdominal pain, constipation and decreased appetite. Pt states she was here approximately 2 weeks ago and was told they found something on her pancreas. Since then pt has been taking pain medication with no relief. Pt also states she has had a fever x 2 days, however afebrile in triage

## 2024-06-10 NOTE — ED ADULT TRIAGE NOTE - CHIEF COMPLAINT QUOTE
Pt presenting to ED c/o fever at home, was advised by MD too come to hospital, states she "has a pancreatic issue"

## 2024-06-10 NOTE — ED PROVIDER NOTE - PHYSICAL EXAMINATION
CONST: Uncomfortable, fatigued appearing in NAD  EYES: PERRL, EOMI, Sclera and conjunctiva clear.   ENT: TM's clear B/L without drainage. Oropharynx normal appearing, no erythema or exudates. Uvula midline.  CARD: Normal S1 S2; Normal rate and rhythm  RESP: Equal BS B/L, No wheezes, rhonchi or rales. No distress  GI: Soft, mild epigastric tenderness, without rebound or guarding.  MS: Normal ROM in all extremities. No midline spinal tenderness.  SKIN: Warm, dry, no acute rashes. Good turgor  NEURO: A&Ox3, No focal deficits. Strength 5/5 with no sensory deficits.

## 2024-06-10 NOTE — ED PROVIDER NOTE - CLINICAL SUMMARY MEDICAL DECISION MAKING FREE TEXT BOX
74yo female with PMHx hypothyroid, palpitations, recent diagnosis of IPMN with suspected liver mets, awaiting biopsy confirmation, and being followed by Dr Last presents with worsening epigastric pain, nausea, and temp 100.7 this morning. Decreased PO intake On exam patient mildly uncomfortable S1-S2 CTAB soft with mild epigastric tenderness  Impression  Patient here with epigastric abdominal pain here CT imaging shows increasing size of cancer.  Patient seen by surgery no acute surgical intervention.  Patient stable discharge.

## 2024-06-10 NOTE — ED PROVIDER NOTE - OBJECTIVE STATEMENT
72yo female with PMHx hypothyroid, palpitations, recent diagnosis of IPMN with suspected liver mets, awaiting biopsy confirmation, and being followed by Dr Last presents with worsening epigastric pain, nausea, and temp 100.7 this morning. Denies vomiting, diarrhea, urinary symptoms. Denies cough or SOB. Pt recently underwent EGD 6/6/24. Pt reports minimal relief from oxycodone and gabapentin.

## 2024-06-10 NOTE — ED PROVIDER NOTE - PROGRESS NOTE DETAILS
Updated patient and  on lab findings, will proceed with CT abdomen/pelvis given WBC count. Also TEAMs Dr Last Discussed with Gen Surg, awaiting discussion with attending Surg d/w Dr Last, can f/u outpatient. Called pharmacy, pt has zofran waiting at pharmacy, will add toradol as patient reported much pain relief.

## 2024-06-10 NOTE — ED PROVIDER NOTE - PATIENT PORTAL LINK FT
You can access the FollowMyHealth Patient Portal offered by [facility] by registering at the following website: [email or web address]. By joining [employer]’s FollowMyHealth portal, you will also be able to view your health information using other applications (apps) compatible with our system.

## 2024-06-10 NOTE — CONSULT NOTE ADULT - SUBJECTIVE AND OBJECTIVE BOX
HPI: 74yo female with PMHx hypothyroid, palpitations, recent diagnosis of IPMN with suspected liver mets, awaiting biopsy confirmation, and being followed by Dr Last presents with worsening epigastric pain, nausea, and temp 100.7 this morning. Pt recently underwent EGD 24, which showed  .  Denies vomiting, diarrhea, urinary symptoms. Denies cough or SOB.  Pt reports minimal relief from oxycodone and gabapentin.  Ct abdomen with IV and PO contrast today shows increased size of the infiltrative pancreatic body mass since 24 now measuring up to 6.3 cm, previously 2.2 cm. as well as increased size and number of bilobar hepatic metastases.   EGD: Small Hill grade 2 hiatal hernia. Nonerosive gastritis (biopsy).    EUS: Pancreatic body mass which appeared heterogeneous, mostly hypoechoic,  irregularly shaped and well-circumscribed, measuring at least 3.3 x 3.2 cm s/p  FNB.    EUS: Multiple hypoechoic, homogeneous, regularly shaped and well-circumscribed  liver lesions with the largest measuring 22 x 18 mm. Otherwise unremarkable EUS  exam.    PAST MEDICAL & SURGICAL HISTORY:  Hypertension  Palpitations  Hypothyroid  High cholesterol  H/O colonoscopy  History of esophagogastroduodenoscopy (EGD)    MEDICATIONS  (STANDING):    MEDICATIONS  (PRN):    Allergies  No Known Allergies      Physical Exam:  General: NAD, resting comfortably  HEENT: NC/AT, EOMI, normal hearing, no oral lesions, no LAD, neck supple  Pulmonary: normal resp effort, CTA-B  Cardiovascular: NSR, no murmurs  Abdominal: soft, ND/NT, no organomegaly  Extremities: WWP, normal strength, no clubbing/cyanosis/edema  Neuro: A/O x 3, CNs II-XII grossly intact, normal sensation, no focal deficits  Pulses: palpable distal pulses    Vital Signs Last 24 Hrs  T(C): 37 (10 Suleman 2024 06:03), Max: 37 (10 Suleman 2024 06:03)  T(F): 98.6 (10 Suleman 2024 06:03), Max: 98.6 (10 Suleman 2024 06:03)  HR: 94 (10 Suleman 2024 06:03) (94 - 94)  BP: 135/84 (10 Suleman 2024 06:03) (135/84 - 135/84)  BP(mean): --  RR: 18 (10 Suleman 2024 06:03) (18 - 18)  SpO2: 99% (10 Suleman 2024 06:03) (99% - 99%)    Parameters below as of 10 Suleman 2024 06:03  Patient On (Oxygen Delivery Method): room air        I&O's Summary          LABS:                        10.4   14.44 )-----------( 342      ( 10 Suleman 2024 08:03 )             31.3     06-10    135  |  92<L>  |  7<L>  ----------------------------<  102<H>  4.4   |  25  |  0.6<L>    Ca    8.7      10 Suleman 2024 08:03    TPro  6.7  /  Alb  3.0<L>  /  TBili  0.7  /  DBili  0.3  /  AST  23  /  ALT  9   /  AlkPhos  100  06-10      Urinalysis Basic - ( 10 Suleman 2024 09:48 )    Color: Yellow / Appearance: Clear / S.011 / pH: x  Gluc: x / Ketone: 15 mg/dL  / Bili: Negative / Urobili: 1.0 mg/dL   Blood: x / Protein: Negative mg/dL / Nitrite: Negative   Leuk Esterase: Trace / RBC: 43 /HPF / WBC 3 /HPF   Sq Epi: x / Non Sq Epi: 9 /HPF / Bacteria: Few /HPF      CAPILLARY BLOOD GLUCOSE        LIVER FUNCTIONS - ( 10 Suleman 2024 08:03 )  Alb: 3.0 g/dL / Pro: 6.7 g/dL / ALK PHOS: 100 U/L / ALT: 9 U/L / AST: 23 U/L / GGT: x             Cultures:      RADIOLOGY & ADDITIONAL STUDIES:       HPI: 74yo female with PMHx hypothyroid, palpitations, recent diagnosis of IPMN with suspected liver mets, awaiting biopsy confirmation, and being followed by Dr Last presents with worsening epigastric pain, nausea, and temp 100.7 this morning.  Pt reports minimal relief from oxycodone and gabapentin which was recently started. Epigastric pain worsening x5 days and patient hasn't eaten much d/t pain. Reports no BMs since she is not eating although she is passing flatus. Pain does not radiate. Patient was started on a bowel regimen by Dr Last (d/t starting oxycodone), patient reports loose BMs prior to starting bowel regimen but previous baseline was constipation. Pt recently underwent EGD 24, which showed small Hill grade 2 hiatal hernia and nonerosive gastritis (biopsy), EUS showed pancreatic body mass which appeared heterogeneous, mostly hypoechoic, irregularly shaped and well-circumscribed, measuring at least 3.3 x 3.2 cm s/p FNB, and multiple hypoechoic, homogeneous, regularly shaped and well-circumscribed liver lesions with the largest measuring 22 x 18 mm. Ct abdomen/pelvis with IV and PO contrast today shows increased size of the infiltrative pancreatic body mass since 24 now measuring up to 6.3 cm, previously 2.2 cm as well as increased size and number of bilobar hepatic metastases. Denies vomiting, diarrhea, urinary symptoms, cough or SOB.         PAST MEDICAL & SURGICAL HISTORY:  Hypertension  Palpitations  Hypothyroid  High cholesterol  H/O colonoscopy  History of esophagogastroduodenoscopy (EGD)    MEDICATIONS  (STANDING):    MEDICATIONS  (PRN):    Allergies  No Known Allergies      Physical Exam:  General: NAD, resting comfortably  Pulmonary: normal resp effort on room air  Cardiovascular: RRR  Abdominal: soft, ND/NT, palpable epigastric mass    Vital Signs Last 24 Hrs  T(C): 37 (10 Suleman 2024 06:03), Max: 37 (10 Suleman 2024 06:03)  T(F): 98.6 (10 Suleman 2024 06:03), Max: 98.6 (10 Suleman 2024 06:03)  HR: 94 (10 Suleman 2024 06:03) (94 - 94)  BP: 135/84 (10 Suleman 2024 06:03) (135/84 - 135/84)  BP(mean): --  RR: 18 (10 Suleman 2024 06:03) (18 - 18)  SpO2: 99% (10 Suleman 2024 06:03) (99% - 99%)    Parameters below as of 10 Suleman 2024 06:03  Patient On (Oxygen Delivery Method): room air      I&O's Summary      LABS:                        10.4   14.44 )-----------( 342      ( 10 Suleman 2024 08:03 )             31.3     06-10    135  |  92<L>  |  7<L>  ----------------------------<  102<H>  4.4   |  25  |  0.6<L>    Ca    8.7      10 Suleman 2024 08:03    TPro  6.7  /  Alb  3.0<L>  /  TBili  0.7  /  DBili  0.3  /  AST  23  /  ALT  9   /  AlkPhos  100  06-10      Urinalysis Basic - ( 10 Suleman 2024 09:48 )    Color: Yellow / Appearance: Clear / S.011 / pH: x  Gluc: x / Ketone: 15 mg/dL  / Bili: Negative / Urobili: 1.0 mg/dL   Blood: x / Protein: Negative mg/dL / Nitrite: Negative   Leuk Esterase: Trace / RBC: 43 /HPF / WBC 3 /HPF   Sq Epi: x / Non Sq Epi: 9 /HPF / Bacteria: Few /HPF      CAPILLARY BLOOD GLUCOSE      LIVER FUNCTIONS - ( 10 Suleman 2024 08:03 )  Alb: 3.0 g/dL / Pro: 6.7 g/dL / ALK PHOS: 100 U/L / ALT: 9 U/L / AST: 23 U/L / GGT: x             RADIOLOGY & ADDITIONAL STUDIES:  < from: CT Abdomen and Pelvis w/ IV Cont (06.10.24 @ 10:38) >  IMPRESSION:  1.  No CT evidence of an acute abdominopelvic pathology.  2.  Since May 22, 2024, increased size of the infiltrative pancreatic   body mass now measuring up to 6.3 cm, previously 2.2 cm.  3.  Increased size and number of bilobar hepatic metastases.    < end of copied text >       HPI: 72yo female with PMHx hypothyroid, palpitations, recent diagnosis of IPMN with suspected liver mets, awaiting biopsy confirmation, and being followed by Dr Last presents with worsening epigastric pain, nausea, and temp 100.7 this morning.  Pt reports minimal relief from oxycodone and gabapentin which was recently started. Epigastric pain worsening x5 days and patient hasn't eaten much d/t pain. Reports no BMs since she is not eating although she is passing flatus. Pain does not radiate. Patient was started on a bowel regimen by Dr Last (d/t starting oxycodone), patient reports loose BMs prior to starting bowel regimen but previous baseline was constipation. Pt recently underwent EGD 24, which showed small Hill grade 2 hiatal hernia and nonerosive gastritis (biopsy), EUS showed pancreatic body mass which appeared heterogeneous, mostly hypoechoic, irregularly shaped and well-circumscribed, measuring at least 3.3 x 3.2 cm s/p FNB, and multiple hypoechoic, homogeneous, regularly shaped and well-circumscribed liver lesions with the largest measuring 22 x 18 mm. Recently had imaging done including CT chest done 24  and MR abdomen w/wo IV contrast on 24, both of which showed pancreatic mass with likely hepatic metastasis. Ct abdomen/pelvis with IV and PO contrast today shows increased size of the infiltrative pancreatic body mass since 24 now measuring up to 6.3 cm, previously 2.2 cm as well as increased size and number of bilobar hepatic metastases. Denies vomiting, diarrhea, urinary symptoms, cough or SOB.         PAST MEDICAL & SURGICAL HISTORY:  Hypertension  Palpitations  Hypothyroid  High cholesterol  H/O colonoscopy  History of esophagogastroduodenoscopy (EGD)    MEDICATIONS  (STANDING):    MEDICATIONS  (PRN):    Allergies  No Known Allergies      Physical Exam:  General: NAD, resting comfortably  Pulmonary: normal resp effort on room air  Cardiovascular: RRR  Abdominal: soft, ND/NT, palpable epigastric mass    Vital Signs Last 24 Hrs  T(C): 37 (10 Slueman 2024 06:03), Max: 37 (10 Suleman 2024 06:03)  T(F): 98.6 (10 Suleman 2024 06:03), Max: 98.6 (10 Suleman 2024 06:03)  HR: 94 (10 Suleman 2024 06:03) (94 - 94)  BP: 135/84 (10 Suleman 2024 06:03) (135/84 - 135/84)  BP(mean): --  RR: 18 (10 Suleman 2024 06:03) (18 - 18)  SpO2: 99% (10 Suleman 2024 06:03) (99% - 99%)    Parameters below as of 10 Suleman 2024 06:03  Patient On (Oxygen Delivery Method): room air      I&O's Summary      LABS:                        10.4   14.44 )-----------( 342      ( 10 Suleman 2024 08:03 )             31.3     06-10    135  |  92<L>  |  7<L>  ----------------------------<  102<H>  4.4   |  25  |  0.6<L>    Ca    8.7      10 Suleman 2024 08:03    TPro  6.7  /  Alb  3.0<L>  /  TBili  0.7  /  DBili  0.3  /  AST  23  /  ALT  9   /  AlkPhos  100  06-10      Urinalysis Basic - ( 10 Suleman 2024 09:48 )    Color: Yellow / Appearance: Clear / S.011 / pH: x  Gluc: x / Ketone: 15 mg/dL  / Bili: Negative / Urobili: 1.0 mg/dL   Blood: x / Protein: Negative mg/dL / Nitrite: Negative   Leuk Esterase: Trace / RBC: 43 /HPF / WBC 3 /HPF   Sq Epi: x / Non Sq Epi: 9 /HPF / Bacteria: Few /HPF      CAPILLARY BLOOD GLUCOSE      LIVER FUNCTIONS - ( 10 Suleman 2024 08:03 )  Alb: 3.0 g/dL / Pro: 6.7 g/dL / ALK PHOS: 100 U/L / ALT: 9 U/L / AST: 23 U/L / GGT: x             RADIOLOGY & ADDITIONAL STUDIES:  < from: CT Abdomen and Pelvis w/ IV Cont (06.10.24 @ 10:38) >  IMPRESSION:  1.  No CT evidence of an acute abdominopelvic pathology.  2.  Since May 22, 2024, increased size of the infiltrative pancreatic   body mass now measuring up to 6.3 cm, previously 2.2 cm.  3.  Increased size and number of bilobar hepatic metastases.  < end of copied text >    < from: MR Abdomen w/wo IV Cont (24 @ 13:11) >  LIVER: Innumerable bilateral lobar hypoenhancing liver lesions consistent   with metastatic disease, measuring up to 1.4 cm in the right lobe. Patent   main portal vein. Slight attenuation of the splenic vein which remains   patent.    < end of copied text >  < from: MR Abdomen w/wo IV Cont (24 @ 13:11) >  PANCREAS: 4.4 x 2.8 cm hypoenhancing pancreatic body mass with soft   tissue encasement at the celiac trifurcation.    < end of copied text >  < from: MR Abdomen w/wo IV Cont (24 @ 13:11) >  IMPRESSION:  Findings highly suspicious for metastatic pancreatic cancer to the liver.   Tissue sampling to follow.    < end of copied text >    < from: CT Chest w/ IV Cont (24 @ 11:43) >  VISUALIZED UPPER ABDOMEN: Numerous hepatic hypodense lesions consistent   with likely metastatic disease and pancreatic mass better visualized on   MRI.    IMPRESSION:    No acute intrathoracic pathology.    No suspicious pulmonary nodules.    Pancreatic mass with likely hepatic metastasis    --- End of Report ---  < end of copied text >

## 2024-06-10 NOTE — ED PROVIDER NOTE - CARE PROVIDER_API CALL
Pallavi Last  [facility] On[facility]  [address] [facility], [address] [place] [address]  Phone: [phone]  Fax: [phone]  Follow Up Time:

## 2024-06-10 NOTE — CONSULT NOTE ADULT - ASSESSMENT
Assessment:     Plan:    Case discussed with surgery attending, Dr Last.            Assessment: 72yo female with PMHx hypothyroid, palpitations, recent diagnosis of IPMN with suspected liver mets, awaiting biopsy confirmation, and being followed by Dr Last presents with worsening epigastric pain, nausea, and temp 100.7 this morning. Decreased PO intake x5days d/t pain, -BM x4 days, +flatus. EGD 6/6/24 findings of small Hill grade 2 hiatal hernia and nonerosive gastritis (biopsy), EUS showed pancreatic body mass which appeared heterogeneous, mostly hypoechoic, irregularly shaped and well-circumscribed, measuring at least 3.3 x 3.2 cm s/p FNB, and multiple hypoechoic, homogeneous, regularly shaped and well-circumscribed liver lesions with the largest measuring 22 x 18 mm. Ct abdomen/pelvis with IV and PO contrast today shows increased size of the infiltrative pancreatic body mass since 5/22/24 now measuring up to 6.3 cm, previously 2.2 cm as well as increased size and number of bilobar hepatic metastases.     Plan:  - Note incomplete, pending plan    Case discussed with surgery attending, Dr Last.            Assessment: 72yo female with PMHx hypothyroid, palpitations, recent diagnosis of IPMN with suspected liver mets, awaiting biopsy confirmation, and being followed by Dr Last presents with worsening epigastric pain, nausea, and temp 100.7 this morning. Decreased PO intake x5days d/t pain, -BM x4 days, +flatus. EGD 6/6/24 findings of small Hill grade 2 hiatal hernia and nonerosive gastritis (biopsy), EUS showed pancreatic body mass which appeared heterogeneous, mostly hypoechoic, irregularly shaped and well-circumscribed, measuring at least 3.3 x 3.2 cm s/p FNB, and multiple hypoechoic, homogeneous, regularly shaped and well-circumscribed liver lesions with the largest measuring 22 x 18 mm. Ct abdomen/pelvis with IV and PO contrast today shows increased size of the infiltrative pancreatic body mass since 5/22/24 now measuring up to 6.3 cm, previously 2.2 cm as well as increased size and number of bilobar hepatic metastases.     Plan:  - No acute surgical intervention indicated.   - F/u with Dr. Last outpatient within 2 weeks.     Case discussed with surgery attending, Dr Last.            Assessment: 74yo female with PMHx hypothyroid, palpitations, recent diagnosis of IPMN with suspected liver mets, awaiting biopsy confirmation, and being followed by Dr Last presents with worsening epigastric pain, nausea, and temp 100.7 this morning. Decreased PO intake x5days d/t pain, -BM x4 days, +flatus. EGD 6/6/24 findings of small Hill grade 2 hiatal hernia and nonerosive gastritis (biopsy), EUS showed pancreatic body mass which appeared heterogeneous, mostly hypoechoic, irregularly shaped and well-circumscribed, measuring at least 3.3 x 3.2 cm s/p FNB, and multiple hypoechoic, homogeneous, regularly shaped and well-circumscribed liver lesions with the largest measuring 22 x 18 mm. Ct abdomen/pelvis with IV and PO contrast today shows increased size of the infiltrative pancreatic body mass since 5/22/24 now measuring up to 6.3 cm, previously 2.2 cm as well as increased size and number of bilobar hepatic metastases.     Plan:  - FNB of pancreatic mass from EGD/EUS pathology pending  - No acute surgical intervention indicated   - F/u with Dr. Last outpatient within 2 weeks  - Dispo per ED    Case discussed with surgery attending, Dr Last.

## 2024-06-11 ENCOUNTER — NON-APPOINTMENT (OUTPATIENT)
Age: 74
End: 2024-06-11

## 2024-06-11 LAB — NON-GYNECOLOGICAL CYTOLOGY STUDY: SIGNIFICANT CHANGE UP

## 2024-06-12 ENCOUNTER — OUTPATIENT (OUTPATIENT)
Dept: OUTPATIENT SERVICES | Facility: HOSPITAL | Age: 74
LOS: 1 days | End: 2024-06-12
Payer: MEDICARE

## 2024-06-12 ENCOUNTER — APPOINTMENT (OUTPATIENT)
Age: 74
End: 2024-06-12
Payer: MEDICARE

## 2024-06-12 ENCOUNTER — NON-APPOINTMENT (OUTPATIENT)
Age: 74
End: 2024-06-12

## 2024-06-12 VITALS
DIASTOLIC BLOOD PRESSURE: 80 MMHG | BODY MASS INDEX: 25.39 KG/M2 | TEMPERATURE: 98.3 F | HEIGHT: 66 IN | HEART RATE: 76 BPM | WEIGHT: 158 LBS | SYSTOLIC BLOOD PRESSURE: 128 MMHG | RESPIRATION RATE: 14 BRPM

## 2024-06-12 DIAGNOSIS — Z98.890 OTHER SPECIFIED POSTPROCEDURAL STATES: Chronic | ICD-10-CM

## 2024-06-12 DIAGNOSIS — C25.9 MALIGNANT NEOPLASM OF PANCREAS, UNSPECIFIED: ICD-10-CM

## 2024-06-12 DIAGNOSIS — K86.89 OTHER SPECIFIED DISEASES OF PANCREAS: ICD-10-CM

## 2024-06-12 DIAGNOSIS — Z86.79 PERSONAL HISTORY OF OTHER DISEASES OF THE CIRCULATORY SYSTEM: ICD-10-CM

## 2024-06-12 DIAGNOSIS — C78.7 MALIGNANT NEOPLASM OF PANCREAS, UNSPECIFIED: ICD-10-CM

## 2024-06-12 DIAGNOSIS — Z86.39 PERSONAL HISTORY OF OTHER ENDOCRINE, NUTRITIONAL AND METABOLIC DISEASE: ICD-10-CM

## 2024-06-12 DIAGNOSIS — Z78.9 OTHER SPECIFIED HEALTH STATUS: ICD-10-CM

## 2024-06-12 PROCEDURE — 99205 OFFICE O/P NEW HI 60 MIN: CPT

## 2024-06-12 PROCEDURE — G2211 COMPLEX E/M VISIT ADD ON: CPT

## 2024-06-12 RX ORDER — OXYCODONE 5 MG/1
5 TABLET ORAL EVERY 4 HOURS
Qty: 120 | Refills: 0 | Status: ACTIVE | COMMUNITY
Start: 2024-06-12 | End: 1900-01-01

## 2024-06-12 NOTE — ASSESSMENT
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  She presented today for initial consultation.  I had an extensive discussion and went over prognosis as well as treatment options and additional ancillary testing that will be done and it is pending.  They are aware that the treatment is palliative and the prognosis was also discussed.  Plan She has excellent performance status and I therefore recommended we start treatment with FOLFIRINOX we spoke about NALFIRINOX and gemcitabine and abraxane as well. -I explained the importance of NGS which will be sent to [facility] as well as germline testing for BRCA mutations and genetic consultation was requested -I also explained that if MSI comes back high we could offer immunotherapy such as Keytruda as first-line but the chance of this possibility as well -After an extensive discussion we decided to proceed with FOLFIRINOX well awaiting additional testing, with G-CSF support. Over side effects in detail including nausea, vomiting, fatigue, neuropathy, diarrhea, mucositis, elevated liver enzymes, constipation and possible complications if patient has enzyme deficiency such as DPD or UGT and possibility of death from chemotherapy were discussed as well  -We spoke about placement of port and possible complications and it was ordered -I explained the role of clinical trials unfortunately we do not have an open in our institution/[facility] for first-line treatment -She is also interested in second opinion and will try to arrange a visit to [facility] prior to initiation of treatment -Imodium and Claritin were ordered she ready has Zofran  # Pain -I refilled oxycodone 5 mg 1 to 2 tablets every 4-6 hours, she has laxatives, this may need to be titrated and possibility of long acting medication discussed I anticipate the pain to improve if she responds to treatment  # We also spoke about the role of thromboembolism prophylaxis specifically apixaban 2.5 mg twice a day in the future --Khorana Risk Score 2 points Intermediate Risk (1-2 Points) Rate of VTE at 2.5 months* = 1.8 - 2.0% [Palliative] : Goals of care discussed with patient: Palliative [Patient/Caregiver not ready to engage] : Patient/Caregiver not ready to engage [AdvancecareDate] : 6/12/24

## 2024-06-12 NOTE — CONSULT LETTER
[Dear  ___] : Dear  [unfilled], [Consult Letter:] : I had the pleasure of evaluating your patient, [unfilled]. [Please see my note below.] : Please see my note below. [Consult Closing:] : Thank you very much for allowing me to participate in the care of this patient.  If you have any questions, please do not hesitate to contact me. [Sincerely,] : Sincerely, [FreeTextEntry3] : Jose C

## 2024-06-12 NOTE — REVIEW OF SYSTEMS
[Fatigue] : fatigue [Abdominal Pain] : abdominal pain [Constipation] : constipation [Diarrhea: Grade 0] : Diarrhea: Grade 0 [Negative] : Allergic/Immunologic [FreeTextEntry7] : nausea and vomiting

## 2024-06-12 NOTE — HISTORY OF PRESENT ILLNESS
[AJCC Stage: ____] : AJCC Stage: [unfilled] [Disease: _____________________] : Disease: [unfilled] [de-identified] : CC: I have pain   Patient is here at the request of Dr. Last  This is a 73 year old female who was addmited to [facility] on 5/22/2024 when she presented for abdominal pain.  The patient has a history of hyperlipidemia, hypothyroidism and palpitations.  The patient underwent a CT scan that demonstrated peripancreatic fat stranding central area of hypodensity within the pancreatic body (measuring 2.2 x 1.8 cm), along w new numerous hepatic hypodense lesions highly concerning for metastatic disease.  In April she was seen by GI for abdominal pain  and did have an MRCP (from hoptal records) 4/10/2024 MRCP report at the time "tiny barely perceptible punctate body and tail side branch cysts measuring up to 3mm corresponding to the sonographic abnormality without high risk worrysome features most compatible with the earliest manifestation of IPMN. Mild layering gallbladder sludge is notessonographically without gallstones or biliary obstruction. there is a tiny 3mm gallbladder polyp stratified as low risk."  CA 19-9 was reported to be 31  She than had the following work up: Review of imaging: CT 5/22/24Findings concerning for acute pancreatitis despite normal lipase level. Pancreatic body hypodensity may represent an underlying lesion versus an area of necrosis. Recommend MRI pancreas with and without contrast for further evaluation.  6/4/2024 MR abdomen:  LIVER: Innumerable bilateral lobar hypoenhancing liver lesions consistent with metastatic disease, measuring up to 1.4 cm in the  right lobe PANCREAS: 4.4 x 2.8 cm hypoenhancing pancreatic body mass with soft tissue encasement at the celiac trifurcation.   6/6/2024: ERCP/EUS: Pancreatic body mass which appeared heterogeneous, mostly hypoechoic, irregularly shaped and well-circumscribed, measuring at least 3.3 x 3.2 cm s/p  FNB.Multiple hypoechoic, homogeneous, regularly shaped and well-circumscribed liver lesions with the largest measuring 22 x 18 mm. Otherwise unremarkable EUS exam. PAth: ANCREAS, BODY MASS; EUS GUIDED, FINE NEEDLE ASPIRATION    (FNA) : POSITIVE FOR MALIGNANT CELLS. - Adenocarcinoma. - Tumor necrosis noted.   6/7/2024 CT chest: DELIA 6/10/24: CT abd/pelvis  No CT evidence of an acute abdominopelvic pathology. 2.  Since May 22, 2024, increased size of the infiltrative pancreatic body mass now measuring up to 6.3 cm, previously 2.2 cm. 3.  Increased size and number of bilobar hepatic metastases.  Review blood work From 610 white blood cell count 14 hemoglobin 10.4 platelets 342 creatinine 0.6 LFTs are normal albumin is 3 but was 3.6 a few weeks before iron saturation 11% ferritin 109 lipase was 15 on 610 B12 is normal folate is normal   She has been having worsening pain and is now on Percocet that help. She has pain when she eats. Fatigue, Denies weiht loss. Does not have many pain medication left since insurance only paid for a certain supply [de-identified] : Adenocarcinoma

## 2024-06-13 DIAGNOSIS — K86.89 OTHER SPECIFIED DISEASES OF PANCREAS: ICD-10-CM

## 2024-06-13 DIAGNOSIS — K44.9 DIAPHRAGMATIC HERNIA WITHOUT OBSTRUCTION OR GANGRENE: ICD-10-CM

## 2024-06-13 DIAGNOSIS — C25.1 MALIGNANT NEOPLASM OF BODY OF PANCREAS: ICD-10-CM

## 2024-06-13 DIAGNOSIS — C25.9 MALIGNANT NEOPLASM OF PANCREAS, UNSPECIFIED: ICD-10-CM

## 2024-06-13 DIAGNOSIS — K29.50 UNSPECIFIED CHRONIC GASTRITIS WITHOUT BLEEDING: ICD-10-CM

## 2024-06-14 ENCOUNTER — NON-APPOINTMENT (OUTPATIENT)
Age: 74
End: 2024-06-14

## 2024-06-15 LAB
CULTURE RESULTS: SIGNIFICANT CHANGE UP
CULTURE RESULTS: SIGNIFICANT CHANGE UP
SPECIMEN SOURCE: SIGNIFICANT CHANGE UP
SPECIMEN SOURCE: SIGNIFICANT CHANGE UP

## 2024-06-17 ENCOUNTER — OUTPATIENT (OUTPATIENT)
Dept: OUTPATIENT SERVICES | Facility: HOSPITAL | Age: 74
LOS: 1 days | End: 2024-06-17
Payer: MEDICARE

## 2024-06-17 VITALS
OXYGEN SATURATION: 96 % | RESPIRATION RATE: 18 BRPM | HEIGHT: 67 IN | HEART RATE: 80 BPM | WEIGHT: 160.06 LBS | DIASTOLIC BLOOD PRESSURE: 76 MMHG | TEMPERATURE: 98 F | SYSTOLIC BLOOD PRESSURE: 114 MMHG

## 2024-06-17 DIAGNOSIS — Z98.890 OTHER SPECIFIED POSTPROCEDURAL STATES: Chronic | ICD-10-CM

## 2024-06-17 DIAGNOSIS — C25.9 MALIGNANT NEOPLASM OF PANCREAS, UNSPECIFIED: ICD-10-CM

## 2024-06-17 DIAGNOSIS — Z01.818 ENCOUNTER FOR OTHER PREPROCEDURAL EXAMINATION: ICD-10-CM

## 2024-06-17 LAB
ANION GAP SERPL CALC-SCNC: 22 MMOL/L — HIGH (ref 7–14)
APTT BLD: 32.3 SEC — SIGNIFICANT CHANGE UP (ref 27–39.2)
BASOPHILS # BLD AUTO: 0 K/UL — SIGNIFICANT CHANGE UP (ref 0–0.2)
BASOPHILS NFR BLD AUTO: 0 % — SIGNIFICANT CHANGE UP (ref 0–1)
BUN SERPL-MCNC: 7 MG/DL — LOW (ref 10–20)
CALCIUM SERPL-MCNC: 8.1 MG/DL — LOW (ref 8.4–10.5)
CHLORIDE SERPL-SCNC: 88 MMOL/L — LOW (ref 98–110)
CO2 SERPL-SCNC: 22 MMOL/L — SIGNIFICANT CHANGE UP (ref 17–32)
CREAT SERPL-MCNC: 0.8 MG/DL — SIGNIFICANT CHANGE UP (ref 0.7–1.5)
EGFR: 79 ML/MIN/1.73M2 — SIGNIFICANT CHANGE UP
EOSINOPHIL # BLD AUTO: 0.24 K/UL — SIGNIFICANT CHANGE UP (ref 0–0.7)
EOSINOPHIL NFR BLD AUTO: 1 % — SIGNIFICANT CHANGE UP (ref 0–8)
GIANT PLATELETS BLD QL SMEAR: PRESENT — SIGNIFICANT CHANGE UP
GLUCOSE SERPL-MCNC: 71 MG/DL — SIGNIFICANT CHANGE UP (ref 70–99)
HCT VFR BLD CALC: 30 % — LOW (ref 37–47)
HGB BLD-MCNC: 9.7 G/DL — LOW (ref 12–16)
HYPOCHROMIA BLD QL: SLIGHT — SIGNIFICANT CHANGE UP
INR BLD: 1.36 RATIO — HIGH (ref 0.65–1.3)
LYMPHOCYTES # BLD AUTO: 1.19 K/UL — SIGNIFICANT CHANGE UP (ref 1–3.3)
LYMPHOCYTES # BLD AUTO: 5 % — LOW (ref 13–44)
MANUAL SMEAR VERIFICATION: SIGNIFICANT CHANGE UP
MCHC RBC-ENTMCNC: 27.3 PG — SIGNIFICANT CHANGE UP (ref 27–31)
MCHC RBC-ENTMCNC: 32.3 G/DL — SIGNIFICANT CHANGE UP (ref 32–37)
MCV RBC AUTO: 84.5 FL — SIGNIFICANT CHANGE UP (ref 81–99)
MONOCYTES # BLD AUTO: 0.95 K/UL — HIGH (ref 0.1–0.6)
MONOCYTES NFR BLD AUTO: 4 % — SIGNIFICANT CHANGE UP (ref 1.7–9.3)
NEUTROPHILS # BLD AUTO: 21.39 K/UL — HIGH (ref 1.4–6.5)
NEUTROPHILS NFR BLD AUTO: 87 % — HIGH (ref 42.2–75.2)
NEUTS BAND # BLD: 3 % — SIGNIFICANT CHANGE UP (ref 0–6)
NRBC # BLD: 0 /100 WBCS — SIGNIFICANT CHANGE UP (ref 0–0)
NRBC # BLD: SIGNIFICANT CHANGE UP /100 WBCS (ref 0–0)
OVALOCYTES BLD QL SMEAR: SLIGHT — SIGNIFICANT CHANGE UP
PLAT MORPH BLD: ABNORMAL
PLATELET # BLD AUTO: 541 K/UL — HIGH (ref 130–400)
PMV BLD: 11 FL — HIGH (ref 7.4–10.4)
POLYCHROMASIA BLD QL SMEAR: SLIGHT — SIGNIFICANT CHANGE UP
POTASSIUM SERPL-MCNC: 5 MMOL/L — SIGNIFICANT CHANGE UP (ref 3.5–5)
POTASSIUM SERPL-SCNC: 5 MMOL/L — SIGNIFICANT CHANGE UP (ref 3.5–5)
PROTHROM AB SERPL-ACNC: 15.5 SEC — HIGH (ref 9.95–12.87)
RBC # BLD: 3.55 M/UL — LOW (ref 4.2–5.4)
RBC # FLD: 15.3 % — HIGH (ref 11.5–14.5)
RBC BLD AUTO: ABNORMAL
SODIUM SERPL-SCNC: 132 MMOL/L — LOW (ref 135–146)
WBC # BLD: 23.77 K/UL — HIGH (ref 4.8–10.8)
WBC # FLD AUTO: 23.77 K/UL — HIGH (ref 4.8–10.8)

## 2024-06-17 PROCEDURE — 80048 BASIC METABOLIC PNL TOTAL CA: CPT

## 2024-06-17 PROCEDURE — 93010 ELECTROCARDIOGRAM REPORT: CPT

## 2024-06-17 PROCEDURE — 85025 COMPLETE CBC W/AUTO DIFF WBC: CPT

## 2024-06-17 PROCEDURE — 85730 THROMBOPLASTIN TIME PARTIAL: CPT

## 2024-06-17 PROCEDURE — 99214 OFFICE O/P EST MOD 30 MIN: CPT | Mod: 25

## 2024-06-17 PROCEDURE — 85610 PROTHROMBIN TIME: CPT

## 2024-06-17 PROCEDURE — 93005 ELECTROCARDIOGRAM TRACING: CPT

## 2024-06-17 PROCEDURE — 36415 COLL VENOUS BLD VENIPUNCTURE: CPT

## 2024-06-17 RX ORDER — OXYCODONE AND ACETAMINOPHEN 5; 325 MG/1; MG/1
5-325 TABLET ORAL EVERY 6 HOURS
Qty: 120 | Refills: 0 | Status: COMPLETED | COMMUNITY
Start: 2024-06-04 | End: 2024-06-17

## 2024-06-17 RX ORDER — ACETAMINOPHEN 325 MG
2 TABLET ORAL
Refills: 0 | DISCHARGE

## 2024-06-17 RX ORDER — OXYCODONE HYDROCHLORIDE 100 MG/5ML
1 SOLUTION ORAL
Refills: 0 | DISCHARGE

## 2024-06-17 RX ORDER — METOPROLOL TARTRATE 50 MG
1 TABLET ORAL
Refills: 0 | DISCHARGE

## 2024-06-17 RX ORDER — IBANDRONATE SODIUM 150 MG/1
1 TABLET ORAL
Refills: 0 | DISCHARGE

## 2024-06-17 RX ORDER — LEVOTHYROXINE SODIUM 125 MCG
1 TABLET ORAL
Refills: 0 | DISCHARGE

## 2024-06-17 RX ORDER — EVOLOCUMAB 140 MG/ML
140 INJECTION, SOLUTION SUBCUTANEOUS
Refills: 0 | DISCHARGE

## 2024-06-17 RX ORDER — ASPIRIN 325 MG/1
1 TABLET, FILM COATED ORAL
Refills: 0 | DISCHARGE

## 2024-06-18 ENCOUNTER — OUTPATIENT (OUTPATIENT)
Dept: OUTPATIENT SERVICES | Facility: HOSPITAL | Age: 74
LOS: 1 days | End: 2024-06-18
Payer: MEDICARE

## 2024-06-18 DIAGNOSIS — Z01.818 ENCOUNTER FOR OTHER PREPROCEDURAL EXAMINATION: ICD-10-CM

## 2024-06-18 DIAGNOSIS — Z02.9 ENCOUNTER FOR ADMINISTRATIVE EXAMINATIONS, UNSPECIFIED: ICD-10-CM

## 2024-06-18 DIAGNOSIS — Z98.890 OTHER SPECIFIED POSTPROCEDURAL STATES: Chronic | ICD-10-CM

## 2024-06-18 DIAGNOSIS — C25.9 MALIGNANT NEOPLASM OF PANCREAS, UNSPECIFIED: ICD-10-CM

## 2024-06-18 LAB
HCT VFR BLD CALC: 30.6 % — LOW (ref 37–47)
HGB BLD-MCNC: 9.7 G/DL — LOW (ref 12–16)
MCHC RBC-ENTMCNC: 25.8 PG — LOW (ref 27–31)
MCHC RBC-ENTMCNC: 31.7 G/DL — LOW (ref 32–37)
MCV RBC AUTO: 81.4 FL — SIGNIFICANT CHANGE UP (ref 81–99)
NRBC # BLD: 0 /100 WBCS — SIGNIFICANT CHANGE UP (ref 0–0)
PLATELET # BLD AUTO: 624 K/UL — HIGH (ref 130–400)
PMV BLD: 10.3 FL — SIGNIFICANT CHANGE UP (ref 7.4–10.4)
RBC # BLD: 3.76 M/UL — LOW (ref 4.2–5.4)
RBC # FLD: 15.3 % — HIGH (ref 11.5–14.5)
SODIUM SERPL-SCNC: 131 MMOL/L — LOW (ref 135–146)
WBC # BLD: 27.21 K/UL — HIGH (ref 4.8–10.8)
WBC # FLD AUTO: 27.21 K/UL — HIGH (ref 4.8–10.8)

## 2024-06-18 PROCEDURE — 84295 ASSAY OF SERUM SODIUM: CPT

## 2024-06-18 PROCEDURE — 36415 COLL VENOUS BLD VENIPUNCTURE: CPT

## 2024-06-18 PROCEDURE — 85027 COMPLETE CBC AUTOMATED: CPT

## 2024-06-19 ENCOUNTER — APPOINTMENT (OUTPATIENT)
Dept: HEMATOLOGY ONCOLOGY | Facility: CLINIC | Age: 74
End: 2024-06-19

## 2024-06-19 DIAGNOSIS — Z02.9 ENCOUNTER FOR ADMINISTRATIVE EXAMINATIONS, UNSPECIFIED: ICD-10-CM

## 2024-06-20 ENCOUNTER — NON-APPOINTMENT (OUTPATIENT)
Age: 74
End: 2024-06-20

## 2024-06-21 NOTE — DISCUSSION/SUMMARY
[FreeTextEntry1] : REASON FOR VISIT: Ms. Destiny Coronado is a 73-year-old female who was referred by Dr. Destin Mckeon for cancer genetic counseling and risk assessment due to a personal history of pancreatic cancer. The patient was seen on 2024 through [facility]. The patient was accompanied by her  (Regan) and daughter (Harmony).   RELEVANT MEDICAL AND SURGICAL HISTORY: Ms. Coronado was recently diagnosed with stage IV pancreatic adenocarcinoma. She will be proceeding with chemotherapy.   OTHER MEDICAL AND SURGICAL HISTORY: - Constipation - Loss of appetite - Osteoarthritis  - Hyperlipidemia   PAST OB/GYN HISTORY: Obstetrical History:  Post-Menopausal   CANCER SCREENING HISTORY: Breast: Pt reported no hx of breast biopsies/abnormalities. GYN: Patient reported no abnormalities. Colon: Last colonoscopy 4.5 years ago, the patient reported no polyps.  Skin: Denied    SOCIAL HISTORY: Tobacco-product use: Denied  Alcohol: Denied    FAMILY HISTORY: Paternal ancestry was reported as [place] and maternal ancestry was reported as [place]. A detailed family history of cancer was ascertained, see below for pedigree. Of note: - Limited knowledge of extended family's health. No known FHx of cancer.    The remaining family history is unremarkable. According to the patient there are no other known cases of significant cancers in the family. To her knowledge no one else in the family has had germline testing for cancer susceptibility.   RISK ASSESSMENT: Ms. Coronado's personal history of cancer is suggestive of a hereditary cancer susceptibility syndrome. Variants in pancreatic cancer susceptibility genes were of specific concern.   We discussed the risks, benefits and limitations, financial cost and implications of genetic testing. We also discussed the psychosocial implications of genetic testing. Possible test results were reviewed with the patient, along with associated medical management options. The Genetic Information Non-discrimination Act (BEATRICE) was also reviewed.   The patient consented to genetic testing for hereditary cancer. A sample was obtained from the patient in our clinic and will be sent to [facility] for analysis.   PLAN: 1. The patient's sample will be sent to [facility] for analysis. 2. We will contact the patient once the results are available. Results generally return in 2-3 weeks.   For any additional questions please call Cancer Genetics at [phone] or [phone].     Billie Riley MS, [facility] [place] [facility] Genetic Counselor, Cancer Genetics

## 2024-06-25 PROBLEM — C25.9 MALIGNANT NEOPLASM OF PANCREAS, UNSPECIFIED: Chronic | Status: ACTIVE | Noted: 2024-06-17

## 2024-06-26 ENCOUNTER — APPOINTMENT (OUTPATIENT)
Age: 74
End: 2024-06-26

## 2024-06-26 ENCOUNTER — NON-APPOINTMENT (OUTPATIENT)
Age: 74
End: 2024-06-26

## 2024-06-28 ENCOUNTER — APPOINTMENT (OUTPATIENT)
Age: 74
End: 2024-06-28

## 2024-07-02 ENCOUNTER — APPOINTMENT (OUTPATIENT)
Dept: SURGERY | Facility: CLINIC | Age: 74
End: 2024-07-02

## 2024-08-08 ENCOUNTER — APPOINTMENT (OUTPATIENT)
Dept: ORTHOPEDIC SURGERY | Facility: CLINIC | Age: 74
End: 2024-08-08